# Patient Record
Sex: MALE | Race: WHITE | NOT HISPANIC OR LATINO | Employment: UNEMPLOYED | URBAN - METROPOLITAN AREA
[De-identification: names, ages, dates, MRNs, and addresses within clinical notes are randomized per-mention and may not be internally consistent; named-entity substitution may affect disease eponyms.]

---

## 2021-01-14 ENCOUNTER — TELEPHONE (OUTPATIENT)
Dept: ENDOCRINOLOGY | Facility: CLINIC | Age: 61
End: 2021-01-14

## 2021-01-20 ENCOUNTER — TELEPHONE (OUTPATIENT)
Dept: NEUROSURGERY | Facility: CLINIC | Age: 61
End: 2021-01-20

## 2021-01-20 RX ORDER — ESOMEPRAZOLE MAGNESIUM 20 MG
20 CAPSULE,DELAYED RELEASE (ENTERIC COATED) ORAL DAILY
COMMUNITY
Start: 2020-12-22

## 2021-01-20 RX ORDER — TESTOSTERONE CYPIONATE 200 MG/ML
INJECTION, SOLUTION INTRAMUSCULAR
COMMUNITY
Start: 2020-10-13 | End: 2021-02-25

## 2021-01-20 RX ORDER — POLYETHYLENE GLYCOL 3350 17 G/17G
POWDER, FOR SOLUTION ORAL
COMMUNITY
Start: 2020-11-18 | End: 2023-02-24

## 2021-01-20 RX ORDER — FUROSEMIDE 40 MG/1
40 TABLET ORAL 2 TIMES DAILY
COMMUNITY
Start: 2020-12-21

## 2021-01-20 RX ORDER — DICLOFENAC SODIUM 10 MG/G
GEL TOPICAL
COMMUNITY
Start: 2020-12-15 | End: 2021-05-03 | Stop reason: SDUPTHER

## 2021-01-20 RX ORDER — DOCUSATE CALCIUM 240 MG
240 CAPSULE ORAL DAILY
COMMUNITY
Start: 2020-12-21

## 2021-01-20 RX ORDER — LEVOFLOXACIN 500 MG/1
500 TABLET, FILM COATED ORAL DAILY
COMMUNITY
Start: 2021-01-19 | End: 2022-04-18

## 2021-01-20 RX ORDER — LANOLIN ALCOHOL/MO/W.PET/CERES
400 CREAM (GRAM) TOPICAL 2 TIMES DAILY
COMMUNITY
Start: 2020-12-21

## 2021-01-20 RX ORDER — ERENUMAB-AOOE 140 MG/ML
INJECTION, SOLUTION SUBCUTANEOUS
COMMUNITY
Start: 2021-01-14

## 2021-01-20 RX ORDER — CLINDAMYCIN PHOSPHATE 10 MG/G
GEL TOPICAL
COMMUNITY
Start: 2020-10-13

## 2021-01-20 RX ORDER — LEVOTHYROXINE SODIUM 75 UG/1
TABLET ORAL
COMMUNITY
Start: 2020-12-21 | End: 2021-02-18 | Stop reason: SDUPTHER

## 2021-01-20 RX ORDER — LINACLOTIDE 290 UG/1
CAPSULE, GELATIN COATED ORAL
COMMUNITY
Start: 2020-10-13 | End: 2021-02-18

## 2021-01-20 RX ORDER — AMMONIUM LACTATE 12 G/100G
LOTION TOPICAL
COMMUNITY
Start: 2020-10-13

## 2021-01-20 RX ORDER — LUBIPROSTONE 8 UG/1
CAPSULE, GELATIN COATED ORAL
COMMUNITY
Start: 2021-01-14 | End: 2021-01-20

## 2021-01-20 RX ORDER — PYRIDOXINE HYDROCHLORIDE 100 MG/ML
INJECTION, SOLUTION INTRAMUSCULAR; INTRAVENOUS
COMMUNITY
Start: 2020-11-13

## 2021-01-20 RX ORDER — CETIRIZINE HYDROCHLORIDE 10 MG/1
10 TABLET ORAL DAILY
COMMUNITY
Start: 2020-12-21

## 2021-01-20 RX ORDER — LIDOCAINE 50 MG/G
PATCH TOPICAL
COMMUNITY
Start: 2020-10-20 | End: 2021-05-03 | Stop reason: SDUPTHER

## 2021-01-20 RX ORDER — INFLUENZA A VIRUS A/NEBRASKA/14/2019 (H1N1) ANTIGEN (MDCK CELL DERIVED, PROPIOLACTONE INACTIVATED), INFLUENZA A VIRUS A/DELAWARE/39/2019 (H3N2) ANTIGEN (MDCK CELL DERIVED, PROPIOLACTONE INACTIVATED), INFLUENZA B VIRUS B/SINGAPORE/INFTT-16-0610/2016 ANTIGEN (MDCK CELL DERIVED, PROPIOLACTONE INACTIVATED), INFLUENZA B VIRUS B/DARWIN/7/2019 ANTIGEN (MDCK CELL DERIVED, PROPIOLACTONE INACTIVATED) 15; 15; 15; 15 UG/.5ML; UG/.5ML; UG/.5ML; UG/.5ML
INJECTION, SUSPENSION INTRAMUSCULAR
COMMUNITY
Start: 2020-10-31 | End: 2022-11-29

## 2021-02-18 ENCOUNTER — OFFICE VISIT (OUTPATIENT)
Dept: NEUROSURGERY | Facility: CLINIC | Age: 61
End: 2021-02-18
Payer: OTHER GOVERNMENT

## 2021-02-18 ENCOUNTER — OFFICE VISIT (OUTPATIENT)
Dept: ENDOCRINOLOGY | Facility: CLINIC | Age: 61
End: 2021-02-18
Payer: OTHER GOVERNMENT

## 2021-02-18 ENCOUNTER — TELEPHONE (OUTPATIENT)
Dept: PAIN MEDICINE | Facility: CLINIC | Age: 61
End: 2021-02-18

## 2021-02-18 ENCOUNTER — OFFICE VISIT (OUTPATIENT)
Dept: PAIN MEDICINE | Facility: CLINIC | Age: 61
End: 2021-02-18
Payer: OTHER GOVERNMENT

## 2021-02-18 VITALS
HEIGHT: 71 IN | RESPIRATION RATE: 18 BRPM | SYSTOLIC BLOOD PRESSURE: 98 MMHG | BODY MASS INDEX: 15.2 KG/M2 | HEART RATE: 70 BPM | WEIGHT: 108.56 LBS | TEMPERATURE: 97 F | DIASTOLIC BLOOD PRESSURE: 56 MMHG | OXYGEN SATURATION: 99 %

## 2021-02-18 VITALS
RESPIRATION RATE: 18 BRPM | BODY MASS INDEX: 15.18 KG/M2 | SYSTOLIC BLOOD PRESSURE: 98 MMHG | HEART RATE: 80 BPM | WEIGHT: 108.44 LBS | TEMPERATURE: 98 F | HEIGHT: 71 IN | DIASTOLIC BLOOD PRESSURE: 56 MMHG

## 2021-02-18 VITALS
HEART RATE: 80 BPM | BODY MASS INDEX: 15.12 KG/M2 | WEIGHT: 108 LBS | OXYGEN SATURATION: 99 % | HEIGHT: 71 IN | SYSTOLIC BLOOD PRESSURE: 98 MMHG | DIASTOLIC BLOOD PRESSURE: 56 MMHG | TEMPERATURE: 97 F

## 2021-02-18 DIAGNOSIS — M54.12 CERVICAL RADICULOPATHY: Primary | ICD-10-CM

## 2021-02-18 DIAGNOSIS — Z01.818 ENCOUNTER FOR OTHER PREPROCEDURAL EXAMINATION: ICD-10-CM

## 2021-02-18 DIAGNOSIS — E29.1 HYPOGONADISM IN MALE: ICD-10-CM

## 2021-02-18 DIAGNOSIS — G56.20 CUBITAL TUNNEL SYNDROME, UNSPECIFIED LATERALITY: Primary | ICD-10-CM

## 2021-02-18 DIAGNOSIS — M47.816 LUMBAR SPONDYLOSIS: ICD-10-CM

## 2021-02-18 DIAGNOSIS — M43.12 SPONDYLOLISTHESIS OF CERVICAL REGION: ICD-10-CM

## 2021-02-18 DIAGNOSIS — Z98.1 HISTORY OF SPINAL FUSION: ICD-10-CM

## 2021-02-18 DIAGNOSIS — M50.30 DDD (DEGENERATIVE DISC DISEASE), CERVICAL: ICD-10-CM

## 2021-02-18 DIAGNOSIS — G43.909 MIGRAINE SYNDROME: ICD-10-CM

## 2021-02-18 DIAGNOSIS — E03.9 HYPOTHYROIDISM, UNSPECIFIED TYPE: ICD-10-CM

## 2021-02-18 DIAGNOSIS — E23.0 HYPOPITUITARISM: Primary | ICD-10-CM

## 2021-02-18 DIAGNOSIS — M85.89 OSTEOPENIA OF MULTIPLE SITES: ICD-10-CM

## 2021-02-18 DIAGNOSIS — G89.4 CHRONIC PAIN DISORDER: ICD-10-CM

## 2021-02-18 PROCEDURE — 99204 OFFICE O/P NEW MOD 45 MIN: CPT | Mod: S$PBB,,, | Performed by: INTERNAL MEDICINE

## 2021-02-18 PROCEDURE — 99999 PR PBB SHADOW E&M-EST. PATIENT-LVL IV: CPT | Mod: PBBFAC,,, | Performed by: STUDENT IN AN ORGANIZED HEALTH CARE EDUCATION/TRAINING PROGRAM

## 2021-02-18 PROCEDURE — 99214 OFFICE O/P EST MOD 30 MIN: CPT | Mod: PBBFAC,27,PO | Performed by: INTERNAL MEDICINE

## 2021-02-18 PROCEDURE — 99205 OFFICE O/P NEW HI 60 MIN: CPT | Mod: S$PBB,,, | Performed by: STUDENT IN AN ORGANIZED HEALTH CARE EDUCATION/TRAINING PROGRAM

## 2021-02-18 PROCEDURE — 99205 PR OFFICE/OUTPT VISIT, NEW, LEVL V, 60-74 MIN: ICD-10-PCS | Mod: S$PBB,,, | Performed by: STUDENT IN AN ORGANIZED HEALTH CARE EDUCATION/TRAINING PROGRAM

## 2021-02-18 PROCEDURE — 99215 OFFICE O/P EST HI 40 MIN: CPT | Mod: PBBFAC,27,PN | Performed by: ANESTHESIOLOGY

## 2021-02-18 PROCEDURE — 99999 PR PBB SHADOW E&M-EST. PATIENT-LVL V: ICD-10-PCS | Mod: PBBFAC,,, | Performed by: ANESTHESIOLOGY

## 2021-02-18 PROCEDURE — 99214 OFFICE O/P EST MOD 30 MIN: CPT | Mod: PBBFAC,PN | Performed by: STUDENT IN AN ORGANIZED HEALTH CARE EDUCATION/TRAINING PROGRAM

## 2021-02-18 PROCEDURE — 99999 PR PBB SHADOW E&M-EST. PATIENT-LVL IV: ICD-10-PCS | Mod: PBBFAC,,, | Performed by: STUDENT IN AN ORGANIZED HEALTH CARE EDUCATION/TRAINING PROGRAM

## 2021-02-18 PROCEDURE — 99204 OFFICE O/P NEW MOD 45 MIN: CPT | Mod: S$PBB,,, | Performed by: ANESTHESIOLOGY

## 2021-02-18 PROCEDURE — 99999 PR PBB SHADOW E&M-EST. PATIENT-LVL V: CPT | Mod: PBBFAC,,, | Performed by: ANESTHESIOLOGY

## 2021-02-18 PROCEDURE — 99999 PR PBB SHADOW E&M-EST. PATIENT-LVL IV: CPT | Mod: PBBFAC,,, | Performed by: INTERNAL MEDICINE

## 2021-02-18 PROCEDURE — 99204 PR OFFICE/OUTPT VISIT, NEW, LEVL IV, 45-59 MIN: ICD-10-PCS | Mod: S$PBB,,, | Performed by: ANESTHESIOLOGY

## 2021-02-18 PROCEDURE — 99999 PR PBB SHADOW E&M-EST. PATIENT-LVL IV: ICD-10-PCS | Mod: PBBFAC,,, | Performed by: INTERNAL MEDICINE

## 2021-02-18 PROCEDURE — 99204 PR OFFICE/OUTPT VISIT, NEW, LEVL IV, 45-59 MIN: ICD-10-PCS | Mod: S$PBB,,, | Performed by: INTERNAL MEDICINE

## 2021-02-18 RX ORDER — OXYCODONE AND ACETAMINOPHEN 5; 325 MG/1; MG/1
1 TABLET ORAL EVERY 12 HOURS PRN
Qty: 60 TABLET | Refills: 0 | Status: SHIPPED | OUTPATIENT
Start: 2021-02-18 | End: 2021-03-20

## 2021-02-18 RX ORDER — LEVOTHYROXINE SODIUM 75 UG/1
75 TABLET ORAL
Qty: 90 TABLET | Refills: 3 | Status: SHIPPED | OUTPATIENT
Start: 2021-02-18 | End: 2021-08-05 | Stop reason: SDUPTHER

## 2021-02-25 ENCOUNTER — TELEPHONE (OUTPATIENT)
Dept: ENDOCRINOLOGY | Facility: CLINIC | Age: 61
End: 2021-02-25

## 2021-02-25 DIAGNOSIS — E29.1 HYPOGONADISM MALE: Primary | ICD-10-CM

## 2021-02-25 RX ORDER — TESTOSTERONE CYPIONATE 200 MG/ML
100 INJECTION, SOLUTION INTRAMUSCULAR
Qty: 2 ML | Refills: 4 | Status: SHIPPED | OUTPATIENT
Start: 2021-02-25 | End: 2021-08-05 | Stop reason: SDUPTHER

## 2021-03-10 ENCOUNTER — TELEPHONE (OUTPATIENT)
Dept: PAIN MEDICINE | Facility: CLINIC | Age: 61
End: 2021-03-10

## 2021-03-18 ENCOUNTER — OFFICE VISIT (OUTPATIENT)
Dept: PAIN MEDICINE | Facility: CLINIC | Age: 61
End: 2021-03-18
Payer: OTHER GOVERNMENT

## 2021-03-18 ENCOUNTER — TELEPHONE (OUTPATIENT)
Dept: ENDOCRINOLOGY | Facility: CLINIC | Age: 61
End: 2021-03-18

## 2021-03-18 ENCOUNTER — TELEPHONE (OUTPATIENT)
Dept: PAIN MEDICINE | Facility: CLINIC | Age: 61
End: 2021-03-18

## 2021-03-18 DIAGNOSIS — R26.81 UNSTEADY GAIT: ICD-10-CM

## 2021-03-18 DIAGNOSIS — M50.30 DDD (DEGENERATIVE DISC DISEASE), CERVICAL: ICD-10-CM

## 2021-03-18 DIAGNOSIS — R26.89 IMBALANCE: ICD-10-CM

## 2021-03-18 DIAGNOSIS — G43.909 MIGRAINE SYNDROME: ICD-10-CM

## 2021-03-18 DIAGNOSIS — M54.12 CERVICAL RADICULOPATHY: ICD-10-CM

## 2021-03-18 DIAGNOSIS — M47.816 LUMBAR SPONDYLOSIS: ICD-10-CM

## 2021-03-18 DIAGNOSIS — R53.81 DEBILITY: Primary | ICD-10-CM

## 2021-03-18 DIAGNOSIS — M48.02 CERVICAL SPINAL STENOSIS: ICD-10-CM

## 2021-03-18 PROCEDURE — 99214 PR OFFICE/OUTPT VISIT, EST, LEVL IV, 30-39 MIN: ICD-10-PCS | Mod: 95,,, | Performed by: ANESTHESIOLOGY

## 2021-03-18 PROCEDURE — 99214 OFFICE O/P EST MOD 30 MIN: CPT | Mod: 95,,, | Performed by: ANESTHESIOLOGY

## 2021-03-18 RX ORDER — OXYCODONE AND ACETAMINOPHEN 5; 325 MG/1; MG/1
1 TABLET ORAL EVERY 12 HOURS PRN
Qty: 60 TABLET | Refills: 0 | Status: CANCELLED | OUTPATIENT
Start: 2021-03-20 | End: 2021-04-19

## 2021-03-18 RX ORDER — OXYCODONE AND ACETAMINOPHEN 5; 325 MG/1; MG/1
1 TABLET ORAL EVERY 12 HOURS PRN
Qty: 60 TABLET | Refills: 0 | Status: CANCELLED | OUTPATIENT
Start: 2021-04-19 | End: 2021-05-19

## 2021-03-19 ENCOUNTER — TELEPHONE (OUTPATIENT)
Dept: PAIN MEDICINE | Facility: CLINIC | Age: 61
End: 2021-03-19

## 2021-03-22 ENCOUNTER — PATIENT MESSAGE (OUTPATIENT)
Dept: PAIN MEDICINE | Facility: CLINIC | Age: 61
End: 2021-03-22

## 2021-03-22 RX ORDER — OXYCODONE AND ACETAMINOPHEN 5; 325 MG/1; MG/1
1 TABLET ORAL EVERY 12 HOURS PRN
Qty: 60 TABLET | Refills: 0 | Status: SHIPPED | OUTPATIENT
Start: 2021-03-22 | End: 2021-04-19 | Stop reason: SDUPTHER

## 2021-03-24 ENCOUNTER — PATIENT MESSAGE (OUTPATIENT)
Dept: ENDOCRINOLOGY | Facility: CLINIC | Age: 61
End: 2021-03-24

## 2021-03-26 ENCOUNTER — TELEPHONE (OUTPATIENT)
Dept: ENDOCRINOLOGY | Facility: CLINIC | Age: 61
End: 2021-03-26

## 2021-04-05 ENCOUNTER — TELEPHONE (OUTPATIENT)
Dept: NEUROSURGERY | Facility: CLINIC | Age: 61
End: 2021-04-05

## 2021-04-13 ENCOUNTER — TELEPHONE (OUTPATIENT)
Dept: PAIN MEDICINE | Facility: CLINIC | Age: 61
End: 2021-04-13

## 2021-04-14 ENCOUNTER — TELEPHONE (OUTPATIENT)
Dept: PAIN MEDICINE | Facility: CLINIC | Age: 61
End: 2021-04-14

## 2021-04-19 ENCOUNTER — PATIENT MESSAGE (OUTPATIENT)
Dept: PAIN MEDICINE | Facility: CLINIC | Age: 61
End: 2021-04-19

## 2021-04-20 RX ORDER — OXYCODONE AND ACETAMINOPHEN 5; 325 MG/1; MG/1
1 TABLET ORAL EVERY 12 HOURS PRN
Qty: 60 TABLET | Refills: 0 | Status: SHIPPED | OUTPATIENT
Start: 2021-04-20 | End: 2021-05-03 | Stop reason: SDUPTHER

## 2021-04-22 ENCOUNTER — PATIENT MESSAGE (OUTPATIENT)
Dept: ENDOCRINOLOGY | Facility: CLINIC | Age: 61
End: 2021-04-22

## 2021-04-22 ENCOUNTER — PATIENT MESSAGE (OUTPATIENT)
Dept: PAIN MEDICINE | Facility: CLINIC | Age: 61
End: 2021-04-22

## 2021-04-22 DIAGNOSIS — R00.0 TACHYCARDIA: Primary | ICD-10-CM

## 2021-04-23 ENCOUNTER — TELEPHONE (OUTPATIENT)
Dept: ENDOCRINOLOGY | Facility: CLINIC | Age: 61
End: 2021-04-23

## 2021-04-26 ENCOUNTER — TELEPHONE (OUTPATIENT)
Dept: ELECTROPHYSIOLOGY | Facility: CLINIC | Age: 61
End: 2021-04-26

## 2021-04-26 DIAGNOSIS — I47.20 VENTRICULAR TACHYCARDIA: Primary | ICD-10-CM

## 2021-04-28 ENCOUNTER — PATIENT MESSAGE (OUTPATIENT)
Dept: ELECTROPHYSIOLOGY | Facility: CLINIC | Age: 61
End: 2021-04-28

## 2021-04-28 ENCOUNTER — TELEPHONE (OUTPATIENT)
Dept: ELECTROPHYSIOLOGY | Facility: CLINIC | Age: 61
End: 2021-04-28

## 2021-04-28 ENCOUNTER — OFFICE VISIT (OUTPATIENT)
Dept: ELECTROPHYSIOLOGY | Facility: CLINIC | Age: 61
End: 2021-04-28
Payer: OTHER GOVERNMENT

## 2021-04-28 VITALS
DIASTOLIC BLOOD PRESSURE: 64 MMHG | BODY MASS INDEX: 15.46 KG/M2 | HEIGHT: 70 IN | HEART RATE: 50 BPM | WEIGHT: 108 LBS | SYSTOLIC BLOOD PRESSURE: 102 MMHG

## 2021-04-28 DIAGNOSIS — I47.20 VENTRICULAR TACHYCARDIA: ICD-10-CM

## 2021-04-28 DIAGNOSIS — R91.8 LUNG MASS: ICD-10-CM

## 2021-04-28 DIAGNOSIS — J84.116 CRYPTOGENIC ORGANIZING PNEUMONIA: ICD-10-CM

## 2021-04-28 DIAGNOSIS — I47.29 NSVT (NONSUSTAINED VENTRICULAR TACHYCARDIA): ICD-10-CM

## 2021-04-28 DIAGNOSIS — R07.89 CHEST DISCOMFORT: ICD-10-CM

## 2021-04-28 DIAGNOSIS — E03.2 HYPOTHYROIDISM DUE TO MEDICATION: Primary | ICD-10-CM

## 2021-04-28 DIAGNOSIS — I20.0 UNSTABLE ANGINA PECTORIS: ICD-10-CM

## 2021-04-28 DIAGNOSIS — I49.3 PVC (PREMATURE VENTRICULAR CONTRACTION): ICD-10-CM

## 2021-04-28 DIAGNOSIS — D49.7 PITUITARY TUMOR: ICD-10-CM

## 2021-04-28 PROCEDURE — 93005 ELECTROCARDIOGRAM TRACING: CPT | Mod: PBBFAC | Performed by: INTERNAL MEDICINE

## 2021-04-28 PROCEDURE — 99205 PR OFFICE/OUTPT VISIT, NEW, LEVL V, 60-74 MIN: ICD-10-PCS | Mod: S$PBB,,, | Performed by: INTERNAL MEDICINE

## 2021-04-28 PROCEDURE — 93010 RHYTHM STRIP: ICD-10-PCS | Mod: S$PBB,,, | Performed by: INTERNAL MEDICINE

## 2021-04-28 PROCEDURE — 99215 OFFICE O/P EST HI 40 MIN: CPT | Mod: PBBFAC | Performed by: INTERNAL MEDICINE

## 2021-04-28 PROCEDURE — 99205 OFFICE O/P NEW HI 60 MIN: CPT | Mod: S$PBB,,, | Performed by: INTERNAL MEDICINE

## 2021-04-28 PROCEDURE — 93010 ELECTROCARDIOGRAM REPORT: CPT | Mod: S$PBB,,, | Performed by: INTERNAL MEDICINE

## 2021-04-28 PROCEDURE — 99999 PR PBB SHADOW E&M-EST. PATIENT-LVL V: ICD-10-PCS | Mod: PBBFAC,,, | Performed by: INTERNAL MEDICINE

## 2021-04-28 PROCEDURE — 99999 PR PBB SHADOW E&M-EST. PATIENT-LVL V: CPT | Mod: PBBFAC,,, | Performed by: INTERNAL MEDICINE

## 2021-04-28 RX ORDER — NITROGLYCERIN 400 UG/1
SPRAY ORAL
COMMUNITY
Start: 2021-04-21 | End: 2021-08-02

## 2021-04-28 RX ORDER — LUBIPROSTONE 24 UG/1
24 CAPSULE ORAL 2 TIMES DAILY WITH MEALS
COMMUNITY

## 2021-04-28 RX ORDER — DIPHENHYDRAMINE HYDROCHLORIDE 25 MG/1
25 CAPSULE ORAL
COMMUNITY
Start: 2021-04-16 | End: 2022-11-29

## 2021-04-28 RX ORDER — METOPROLOL TARTRATE 25 MG/1
25 TABLET, FILM COATED ORAL DAILY
COMMUNITY
Start: 2021-04-21 | End: 2021-04-28

## 2021-04-28 RX ORDER — ATORVASTATIN CALCIUM 40 MG/1
40 TABLET, FILM COATED ORAL DAILY
COMMUNITY
Start: 2021-04-16 | End: 2023-06-01

## 2021-04-28 RX ORDER — PREDNISONE 10 MG/1
10 TABLET ORAL DAILY
COMMUNITY
Start: 2021-04-16 | End: 2021-04-28

## 2021-04-28 RX ORDER — ASPIRIN 81 MG/1
81 TABLET ORAL DAILY
COMMUNITY
Start: 2021-04-16

## 2021-04-28 RX ORDER — METOPROLOL SUCCINATE 50 MG/1
50 TABLET, EXTENDED RELEASE ORAL DAILY
COMMUNITY
Start: 2021-04-16

## 2021-05-03 ENCOUNTER — PATIENT MESSAGE (OUTPATIENT)
Dept: ELECTROPHYSIOLOGY | Facility: CLINIC | Age: 61
End: 2021-05-03

## 2021-05-03 ENCOUNTER — OFFICE VISIT (OUTPATIENT)
Dept: PAIN MEDICINE | Facility: CLINIC | Age: 61
End: 2021-05-03
Payer: OTHER GOVERNMENT

## 2021-05-03 DIAGNOSIS — R53.81 DEBILITY: Primary | ICD-10-CM

## 2021-05-03 DIAGNOSIS — R91.8 LUNG MASS: ICD-10-CM

## 2021-05-03 DIAGNOSIS — R07.89 CHEST DISCOMFORT: ICD-10-CM

## 2021-05-03 DIAGNOSIS — R26.89 IMBALANCE: ICD-10-CM

## 2021-05-03 DIAGNOSIS — M48.02 CERVICAL SPINAL STENOSIS: ICD-10-CM

## 2021-05-03 DIAGNOSIS — R53.83 FATIGUE, UNSPECIFIED TYPE: ICD-10-CM

## 2021-05-03 PROCEDURE — 99024 PR POST-OP FOLLOW-UP VISIT: ICD-10-PCS | Mod: 95,,, | Performed by: ANESTHESIOLOGY

## 2021-05-03 PROCEDURE — 99024 POSTOP FOLLOW-UP VISIT: CPT | Mod: 95,,, | Performed by: ANESTHESIOLOGY

## 2021-05-03 RX ORDER — OXYCODONE AND ACETAMINOPHEN 5; 325 MG/1; MG/1
1 TABLET ORAL EVERY 12 HOURS PRN
Qty: 60 TABLET | Refills: 0 | Status: SHIPPED | OUTPATIENT
Start: 2021-06-19 | End: 2021-07-19

## 2021-05-03 RX ORDER — OXYCODONE AND ACETAMINOPHEN 5; 325 MG/1; MG/1
1 TABLET ORAL EVERY 12 HOURS PRN
Qty: 60 TABLET | Refills: 0 | Status: SHIPPED | OUTPATIENT
Start: 2021-07-19 | End: 2021-08-02 | Stop reason: SDUPTHER

## 2021-05-03 RX ORDER — LIDOCAINE 50 MG/G
PATCH TOPICAL DAILY
Qty: 30 PATCH | Refills: 3 | Status: SHIPPED | OUTPATIENT
Start: 2021-05-03 | End: 2021-08-02 | Stop reason: SDUPTHER

## 2021-05-03 RX ORDER — OXYCODONE AND ACETAMINOPHEN 5; 325 MG/1; MG/1
1 TABLET ORAL EVERY 12 HOURS PRN
Qty: 60 TABLET | Refills: 0 | Status: SHIPPED | OUTPATIENT
Start: 2021-05-20 | End: 2021-06-19

## 2021-05-03 RX ORDER — DICLOFENAC SODIUM 10 MG/G
GEL TOPICAL 3 TIMES DAILY PRN
Qty: 1 TUBE | Refills: 5 | Status: SHIPPED | OUTPATIENT
Start: 2021-05-03 | End: 2021-08-02 | Stop reason: SDUPTHER

## 2021-05-04 ENCOUNTER — PATIENT MESSAGE (OUTPATIENT)
Dept: ELECTROPHYSIOLOGY | Facility: CLINIC | Age: 61
End: 2021-05-04

## 2021-05-06 ENCOUNTER — HOSPITAL ENCOUNTER (OUTPATIENT)
Dept: CARDIOLOGY | Facility: HOSPITAL | Age: 61
Discharge: HOME | End: 2021-05-06
Attending: INTERNAL MEDICINE
Payer: OTHER GOVERNMENT

## 2021-05-06 VITALS — BODY MASS INDEX: 15.46 KG/M2 | WEIGHT: 108 LBS | HEIGHT: 70 IN

## 2021-05-06 DIAGNOSIS — I49.3 PVC (PREMATURE VENTRICULAR CONTRACTION): ICD-10-CM

## 2021-05-06 DIAGNOSIS — I47.29 NSVT (NONSUSTAINED VENTRICULAR TACHYCARDIA): ICD-10-CM

## 2021-05-06 LAB
CV PHARM DOSE: 27.5 MG
CV STRESS BASE HR: 46 BPM
DIASTOLIC BLOOD PRESSURE: 66 MMHG
END DIASTOLIC INDEX-HIGH: 170 ML/M2
END SYSTOLIC INDEX-HIGH: 70 ML/M2
OHS CV CPX 85 PERCENT MAX PREDICTED HEART RATE MALE: 135
OHS CV CPX MAX PREDICTED HEART RATE: 159
OHS CV CPX PATIENT IS FEMALE: 0
OHS CV CPX PATIENT IS MALE: 1
OHS CV CPX PEAK DIASTOLIC BLOOD PRESSURE: 59 MMHG
OHS CV CPX PEAK HEAR RATE: 52 BPM
OHS CV CPX PEAK RATE PRESSURE PRODUCT: 5460
OHS CV CPX PEAK SYSTOLIC BLOOD PRESSURE: 105 MMHG
OHS CV CPX PERCENT MAX PREDICTED HEART RATE ACHIEVED: 33
OHS CV CPX RATE PRESSURE PRODUCT PRESENTING: 5474
RETIRED EF AND QEF - SEE NOTES: 51 %
SYSTOLIC BLOOD PRESSURE: 119 MMHG

## 2021-05-06 PROCEDURE — 93016 CARDIAC PET SCAN STRESS (CUPID ONLY): ICD-10-PCS | Mod: ,,, | Performed by: INTERNAL MEDICINE

## 2021-05-06 PROCEDURE — 78492 CARDIAC PET SCAN STRESS (CUPID ONLY): ICD-10-PCS | Mod: 26,,, | Performed by: INTERNAL MEDICINE

## 2021-05-06 PROCEDURE — 63600175 PHARM REV CODE 636 W HCPCS: Performed by: INTERNAL MEDICINE

## 2021-05-06 PROCEDURE — 93016 CV STRESS TEST SUPVJ ONLY: CPT | Mod: ,,, | Performed by: INTERNAL MEDICINE

## 2021-05-06 PROCEDURE — 93017 CV STRESS TEST TRACING ONLY: CPT

## 2021-05-06 PROCEDURE — 78492 MYOCRD IMG PET MLT RST&STRS: CPT

## 2021-05-06 PROCEDURE — 93018 CARDIAC PET SCAN STRESS (CUPID ONLY): ICD-10-PCS | Mod: ,,, | Performed by: INTERNAL MEDICINE

## 2021-05-06 PROCEDURE — 78492 MYOCRD IMG PET MLT RST&STRS: CPT | Mod: 26,,, | Performed by: INTERNAL MEDICINE

## 2021-05-06 PROCEDURE — 93018 CV STRESS TEST I&R ONLY: CPT | Mod: ,,, | Performed by: INTERNAL MEDICINE

## 2021-05-06 RX ORDER — DIPYRIDAMOLE 5 MG/ML
27.49 INJECTION INTRAVENOUS ONCE
Status: COMPLETED | OUTPATIENT
Start: 2021-05-06 | End: 2021-05-06

## 2021-05-06 RX ADMIN — DIPYRIDAMOLE 27.5 MG: 5 INJECTION INTRAVENOUS at 03:05

## 2021-05-17 ENCOUNTER — PATIENT MESSAGE (OUTPATIENT)
Dept: PAIN MEDICINE | Facility: CLINIC | Age: 61
End: 2021-05-17

## 2021-07-21 ENCOUNTER — PATIENT MESSAGE (OUTPATIENT)
Dept: PAIN MEDICINE | Facility: CLINIC | Age: 61
End: 2021-07-21

## 2021-07-21 ENCOUNTER — PATIENT MESSAGE (OUTPATIENT)
Dept: ENDOCRINOLOGY | Facility: CLINIC | Age: 61
End: 2021-07-21

## 2021-07-21 DIAGNOSIS — R79.89 LOW IGF-1 LEVEL: ICD-10-CM

## 2021-07-21 DIAGNOSIS — E29.1 HYPOGONADISM MALE: Primary | ICD-10-CM

## 2021-07-21 DIAGNOSIS — E23.0 HYPOPITUITARISM: ICD-10-CM

## 2021-07-27 ENCOUNTER — TELEPHONE (OUTPATIENT)
Dept: ENDOCRINOLOGY | Facility: CLINIC | Age: 61
End: 2021-07-27

## 2021-08-02 ENCOUNTER — OFFICE VISIT (OUTPATIENT)
Dept: PAIN MEDICINE | Facility: CLINIC | Age: 61
End: 2021-08-02
Payer: OTHER GOVERNMENT

## 2021-08-02 ENCOUNTER — PATIENT MESSAGE (OUTPATIENT)
Dept: ENDOCRINOLOGY | Facility: CLINIC | Age: 61
End: 2021-08-02

## 2021-08-02 DIAGNOSIS — G89.4 CHRONIC PAIN DISORDER: ICD-10-CM

## 2021-08-02 DIAGNOSIS — M54.12 CERVICAL RADICULOPATHY: ICD-10-CM

## 2021-08-02 DIAGNOSIS — M48.02 CERVICAL SPINAL STENOSIS: Primary | ICD-10-CM

## 2021-08-02 DIAGNOSIS — R53.81 DEBILITY: ICD-10-CM

## 2021-08-02 DIAGNOSIS — R53.83 FATIGUE, UNSPECIFIED TYPE: ICD-10-CM

## 2021-08-02 PROCEDURE — 99213 PR OFFICE/OUTPT VISIT, EST, LEVL III, 20-29 MIN: ICD-10-PCS | Mod: 95,,, | Performed by: ANESTHESIOLOGY

## 2021-08-02 PROCEDURE — 99213 OFFICE O/P EST LOW 20 MIN: CPT | Mod: 95,,, | Performed by: ANESTHESIOLOGY

## 2021-08-02 RX ORDER — OXYCODONE AND ACETAMINOPHEN 5; 325 MG/1; MG/1
1 TABLET ORAL EVERY 12 HOURS PRN
Qty: 60 TABLET | Refills: 0 | Status: SHIPPED | OUTPATIENT
Start: 2021-09-17 | End: 2021-10-17

## 2021-08-02 RX ORDER — OXYCODONE AND ACETAMINOPHEN 5; 325 MG/1; MG/1
1 TABLET ORAL EVERY 12 HOURS PRN
Qty: 60 TABLET | Refills: 0 | Status: SHIPPED | OUTPATIENT
Start: 2021-08-18 | End: 2021-09-17

## 2021-08-02 RX ORDER — DICLOFENAC SODIUM 10 MG/G
GEL TOPICAL 3 TIMES DAILY PRN
Qty: 1 TUBE | Refills: 5 | Status: SHIPPED | OUTPATIENT
Start: 2021-08-02 | End: 2022-08-23 | Stop reason: SDUPTHER

## 2021-08-02 RX ORDER — OXYCODONE AND ACETAMINOPHEN 5; 325 MG/1; MG/1
1 TABLET ORAL EVERY 12 HOURS PRN
Qty: 60 TABLET | Refills: 0 | Status: SHIPPED | OUTPATIENT
Start: 2021-10-17 | End: 2021-11-02 | Stop reason: SDUPTHER

## 2021-08-02 RX ORDER — LIDOCAINE 50 MG/G
PATCH TOPICAL DAILY
Qty: 30 PATCH | Refills: 3 | Status: SHIPPED | OUTPATIENT
Start: 2021-08-02 | End: 2021-11-02 | Stop reason: SDUPTHER

## 2021-08-03 ENCOUNTER — TELEPHONE (OUTPATIENT)
Dept: ENDOCRINOLOGY | Facility: CLINIC | Age: 61
End: 2021-08-03

## 2021-08-04 ENCOUNTER — PATIENT MESSAGE (OUTPATIENT)
Dept: ENDOCRINOLOGY | Facility: CLINIC | Age: 61
End: 2021-08-04

## 2021-08-05 ENCOUNTER — OFFICE VISIT (OUTPATIENT)
Dept: ENDOCRINOLOGY | Facility: CLINIC | Age: 61
End: 2021-08-05
Payer: OTHER GOVERNMENT

## 2021-08-05 ENCOUNTER — PATIENT MESSAGE (OUTPATIENT)
Dept: ENDOCRINOLOGY | Facility: CLINIC | Age: 61
End: 2021-08-05

## 2021-08-05 DIAGNOSIS — E23.0 HYPOPITUITARISM: Primary | ICD-10-CM

## 2021-08-05 DIAGNOSIS — E03.9 HYPOTHYROIDISM, UNSPECIFIED TYPE: ICD-10-CM

## 2021-08-05 DIAGNOSIS — E29.1 HYPOGONADISM IN MALE: ICD-10-CM

## 2021-08-05 DIAGNOSIS — E29.1 HYPOGONADISM MALE: ICD-10-CM

## 2021-08-05 DIAGNOSIS — R79.89 LOW IGF-1 LEVEL: ICD-10-CM

## 2021-08-05 DIAGNOSIS — D35.2 BENIGN NEOPLASM OF PITUITARY GLAND: ICD-10-CM

## 2021-08-05 PROCEDURE — 99214 OFFICE O/P EST MOD 30 MIN: CPT | Mod: 95,,, | Performed by: INTERNAL MEDICINE

## 2021-08-05 PROCEDURE — 99214 PR OFFICE/OUTPT VISIT, EST, LEVL IV, 30-39 MIN: ICD-10-PCS | Mod: 95,,, | Performed by: INTERNAL MEDICINE

## 2021-08-05 RX ORDER — LEVOTHYROXINE SODIUM 75 UG/1
75 TABLET ORAL
Qty: 90 TABLET | Refills: 3 | Status: SHIPPED | OUTPATIENT
Start: 2021-08-05 | End: 2022-02-17 | Stop reason: SDUPTHER

## 2021-08-05 RX ORDER — SILDENAFIL 100 MG/1
100 TABLET, FILM COATED ORAL DAILY PRN
Qty: 30 TABLET | Refills: 5 | Status: SHIPPED | OUTPATIENT
Start: 2021-08-05 | End: 2022-02-17 | Stop reason: SDUPTHER

## 2021-08-05 RX ORDER — TESTOSTERONE CYPIONATE 200 MG/ML
100 INJECTION, SOLUTION INTRAMUSCULAR
Qty: 2 ML | Refills: 5 | Status: SHIPPED | OUTPATIENT
Start: 2021-08-05 | End: 2021-09-14 | Stop reason: SDUPTHER

## 2021-09-09 ENCOUNTER — PATIENT MESSAGE (OUTPATIENT)
Dept: ENDOCRINOLOGY | Facility: CLINIC | Age: 61
End: 2021-09-09

## 2021-09-09 DIAGNOSIS — E29.1 HYPOGONADISM MALE: ICD-10-CM

## 2021-09-14 RX ORDER — TESTOSTERONE CYPIONATE 200 MG/ML
100 INJECTION, SOLUTION INTRAMUSCULAR
Qty: 2 ML | Refills: 5 | Status: SHIPPED | OUTPATIENT
Start: 2021-09-14 | End: 2021-09-14 | Stop reason: SDUPTHER

## 2021-09-14 RX ORDER — TESTOSTERONE CYPIONATE 200 MG/ML
100 INJECTION, SOLUTION INTRAMUSCULAR
Qty: 2 ML | Refills: 5 | Status: SHIPPED | OUTPATIENT
Start: 2021-09-14 | End: 2021-10-26 | Stop reason: SDUPTHER

## 2021-09-24 ENCOUNTER — TELEPHONE (OUTPATIENT)
Dept: PAIN MEDICINE | Facility: CLINIC | Age: 61
End: 2021-09-24

## 2021-09-24 ENCOUNTER — PATIENT MESSAGE (OUTPATIENT)
Dept: ENDOCRINOLOGY | Facility: CLINIC | Age: 61
End: 2021-09-24

## 2021-09-24 DIAGNOSIS — R79.89 LOW IGF-1 LEVEL: ICD-10-CM

## 2021-09-24 DIAGNOSIS — E23.0 HYPOPITUITARISM: Primary | ICD-10-CM

## 2021-09-24 NOTE — TELEPHONE ENCOUNTER
I am not the physician ordering the lung biopsy. I am his pain management physician, you need to request from his pulmonologist or PCP

## 2021-09-27 RX ORDER — MACIMORELIN ACETATE 60 MG/MG
0.5 GRANULE, FOR SOLUTION ORAL
Qty: 1 EACH | Refills: 0 | OUTPATIENT
Start: 2021-09-27 | End: 2021-09-27 | Stop reason: RX

## 2021-10-25 ENCOUNTER — TELEPHONE (OUTPATIENT)
Dept: ENDOCRINOLOGY | Facility: CLINIC | Age: 61
End: 2021-10-25
Payer: OTHER GOVERNMENT

## 2021-10-25 ENCOUNTER — PATIENT MESSAGE (OUTPATIENT)
Dept: ENDOCRINOLOGY | Facility: CLINIC | Age: 61
End: 2021-10-25
Payer: OTHER GOVERNMENT

## 2021-10-25 DIAGNOSIS — E29.1 HYPOGONADISM MALE: ICD-10-CM

## 2021-10-25 NOTE — TELEPHONE ENCOUNTER
----- Message from Hui Latif sent at 10/25/2021  4:03 PM CDT -----  Contact: Evangelina HOYT  Type: Needs Medical Advice    Who Called:-Evangelina HOYT  Best Call Back Number:     or         Requesting a call back regarding -Evangelina BARNES  pharmacy is asking for a call back ASAP   Please Advise- Thank you

## 2021-10-26 RX ORDER — TESTOSTERONE CYPIONATE 200 MG/ML
100 INJECTION, SOLUTION INTRAMUSCULAR
Qty: 4 ML | Refills: 5 | Status: SHIPPED | OUTPATIENT
Start: 2021-10-26 | End: 2021-10-29

## 2021-10-29 RX ORDER — TESTOSTERONE CYPIONATE 200 MG/ML
100 INJECTION, SOLUTION INTRAMUSCULAR
Qty: 4 ML | Refills: 5 | Status: SHIPPED | OUTPATIENT
Start: 2021-10-29 | End: 2021-11-03

## 2021-11-02 ENCOUNTER — OFFICE VISIT (OUTPATIENT)
Dept: PAIN MEDICINE | Facility: CLINIC | Age: 61
End: 2021-11-02
Payer: OTHER GOVERNMENT

## 2021-11-02 DIAGNOSIS — M54.12 CERVICAL RADICULOPATHY: ICD-10-CM

## 2021-11-02 DIAGNOSIS — R53.81 DEBILITY: Primary | ICD-10-CM

## 2021-11-02 DIAGNOSIS — R91.8 LUNG MASS: ICD-10-CM

## 2021-11-02 DIAGNOSIS — G89.4 CHRONIC PAIN DISORDER: ICD-10-CM

## 2021-11-02 PROCEDURE — 99213 PR OFFICE/OUTPT VISIT, EST, LEVL III, 20-29 MIN: ICD-10-PCS | Mod: 95,,, | Performed by: ANESTHESIOLOGY

## 2021-11-02 PROCEDURE — 99213 OFFICE O/P EST LOW 20 MIN: CPT | Mod: 95,,, | Performed by: ANESTHESIOLOGY

## 2021-11-02 RX ORDER — OXYCODONE AND ACETAMINOPHEN 5; 325 MG/1; MG/1
1 TABLET ORAL EVERY 12 HOURS PRN
Qty: 60 TABLET | Refills: 0 | Status: SHIPPED | OUTPATIENT
Start: 2021-11-16 | End: 2021-12-16

## 2021-11-02 RX ORDER — OXYCODONE AND ACETAMINOPHEN 5; 325 MG/1; MG/1
1 TABLET ORAL EVERY 12 HOURS PRN
Qty: 60 TABLET | Refills: 0 | Status: SHIPPED | OUTPATIENT
Start: 2021-12-16 | End: 2022-01-15

## 2021-11-02 RX ORDER — OXYCODONE AND ACETAMINOPHEN 5; 325 MG/1; MG/1
1 TABLET ORAL EVERY 12 HOURS PRN
Qty: 60 TABLET | Refills: 0 | Status: SHIPPED | OUTPATIENT
Start: 2022-01-15 | End: 2022-02-14

## 2021-11-02 RX ORDER — LIDOCAINE 50 MG/G
PATCH TOPICAL DAILY
Qty: 30 PATCH | Refills: 6 | Status: SHIPPED | OUTPATIENT
Start: 2021-11-02 | End: 2022-02-17 | Stop reason: SDUPTHER

## 2021-11-03 RX ORDER — TESTOSTERONE CYPIONATE 200 MG/ML
100 INJECTION, SOLUTION INTRAMUSCULAR
Qty: 4 ML | Refills: 4 | Status: SHIPPED | OUTPATIENT
Start: 2021-11-03 | End: 2022-02-17 | Stop reason: SDUPTHER

## 2022-01-19 ENCOUNTER — PATIENT MESSAGE (OUTPATIENT)
Dept: ENDOCRINOLOGY | Facility: CLINIC | Age: 62
End: 2022-01-19
Payer: OTHER GOVERNMENT

## 2022-01-19 DIAGNOSIS — R79.89 LOW IGF-1 LEVEL: ICD-10-CM

## 2022-01-19 DIAGNOSIS — D35.2 BENIGN NEOPLASM OF PITUITARY GLAND: ICD-10-CM

## 2022-01-19 DIAGNOSIS — E29.1 HYPOGONADISM MALE: Primary | ICD-10-CM

## 2022-01-19 DIAGNOSIS — E03.9 HYPOTHYROIDISM, UNSPECIFIED TYPE: ICD-10-CM

## 2022-01-19 DIAGNOSIS — E23.0 HYPOPITUITARISM: ICD-10-CM

## 2022-01-24 ENCOUNTER — PATIENT MESSAGE (OUTPATIENT)
Dept: ENDOCRINOLOGY | Facility: CLINIC | Age: 62
End: 2022-01-24
Payer: OTHER GOVERNMENT

## 2022-01-24 NOTE — TELEPHONE ENCOUNTER
Can't do virtual unless in [place].    Okay to send orders for MRI and labs (orders entered today).

## 2022-01-31 ENCOUNTER — TELEPHONE (OUTPATIENT)
Dept: ENDOCRINOLOGY | Facility: CLINIC | Age: 62
End: 2022-01-31
Payer: OTHER GOVERNMENT

## 2022-01-31 NOTE — TELEPHONE ENCOUNTER
----- Message from Indu Buchanan MA sent at 1/28/2022  5:05 PM CST -----  Regarding: FW: Advice  Contact: [phone]    ----- Message -----  From: Zachary Savage MA  Sent: 1/28/2022   3:43 PM CST  To: Faizan Jimenez Clinical Staff  Subject: FW: Advice                                         ----- Message -----  From: Joshua Francis  Sent: 1/28/2022   3:37 PM CST  To: Kai TINOCO Staff  Subject: Advice                                           Pt is calling to speak with a nurse in the office has questions about appt. Please contact pt

## 2022-02-10 ENCOUNTER — TELEPHONE (OUTPATIENT)
Dept: ENDOCRINOLOGY | Facility: CLINIC | Age: 62
End: 2022-02-10
Payer: OTHER GOVERNMENT

## 2022-02-10 NOTE — TELEPHONE ENCOUNTER
Got labs. collected 420pm 2/3/2022    TSH 2.8  Free t4 1.2  CBC with WBC 3.15, hgb 13.2. decreased  CMP okay. Normal GFR. ast 45 (normal to 37)    IGF still low. 36.  Testosterone 135 (done in PM)    PSA 0.869    Has appointment next week, can review then

## 2022-02-16 NOTE — PROGRESS NOTES
Subjective:      Chief Complaint: Hypothyroidism, Fatigue, and Hypogonadism    The patient location is: pt reported drive to [place]  The chief complaint leading to consultation is: pituitary    Visit type: audiovisual    Face to Face time with patient: 11 minutes  31 minutes of total time spent on the encounter, which includes face to face time and non-face to face time preparing to see the patient (eg, review of tests), Obtaining and/or reviewing separately obtained history, Documenting clinical information in the electronic or other health record, Independently interpreting results (not separately reported) and communicating results to the patient/family/caregiver, or Care coordination (not separately reported).     Each patient to whom he or she provides medical services by telemedicine is:  (1) informed of the relationship between the physician and patient and the respective role of any other health care provider with respect to management of the patient; and (2) notified that he or she may decline to receive medical services by telemedicine and may withdraw from such care at any time.    Notes:    HPI: Evan Olivo is a 61 y.o. male who is having a follow-up evaluation for pituitary, hypogonadism.   Last seen 8/5/2021    Pt noted hx pituitary tumor.  Surgery 2016. Recurrence, s/p radiation around 2018.     Last MRI 2022: No residual tumor, post-operative changes, stable findings from prior MRI.  Prior MRI 2020: No residual tumor. Post-op changes.     Has hypothyroidism:  Medication: 75 mcg/day levothyroxine.   labs 2/3/2022 TSH 2.8, free t4 1.2, normal     Hypogonadism:  Notes dx around 8 years ago.  On testosterone replacement with gel, then injections. Various doses.    Currently 100 mg q7 days.    Last level 135 (drawn in PM). PSA normal, CBC okay    Last DXA 11/2020. Osteopenia.  Fractures: ankle. No trauma.    Reclast summer 2020, summer 2021 (around July)    Older labs:   WBC <2  Anemia, hemoglobin  10.  Platelets normal  PSA 0.86  Cortisol 13.6 (lab from 7/27/2021 at 805am)   Testosterone 652  IGF 45 (low)    Had recommended GH testing, not done.     Today, pt reports feeling about the same.     Weight stable overall. Lost a few lbs, gained it back.    +fatigue.  No AM erections, no libido.    +headaches, few per week, stable.    Occasional dizziness, gait instability, lightheadedness.   sometimes falls, not lately. Grabs wall and catches himself. No syncope.  +constipation, some abd cramping.    Reviewed past medical, family, social history and updated as appropriate.    Review of Systems  As above    Objective:   Previous vitals:  BP Readings from Last 5 Encounters:   04/28/21 102/64   02/18/21 (!) 98/56 02/18/21 (!) 98/56 02/18/21 (!) 98/56     Physical Exam  Constitutional:       General: he is not in acute distress.  Pulmonary:      Effort: Pulmonary effort is normal.     Wt Readings from Last 10 Encounters:   05/06/21 1519 49 kg (108 lb)   04/28/21 0812 49 kg (108 lb 0.4 oz)   02/18/21 1348 49 kg (108 lb 0.4 oz)   02/18/21 0911 49.2 kg (108 lb 7.5 oz)   02/18/21 0749 49.2 kg (108 lb 9.2 oz)     No results found for: HGBA1C  No results found for: CHOL, HDL, LDLCALC, TRIG, CHOLHDL  No results found for: NA, K, CL, CO2, GLU, BUN, CREATININE, CALCIUM, PROT, ALBUMIN, BILITOT, ALKPHOS, AST, ALT, ANIONGAP, ESTGFRAFRICA, EGFRNONAA, TSH     Assessment/Plan:     Hypopituitarism  Hx pituitary tumor several years ago.   s/p surgery   then recurrence   now s/p brain radiation   - MRI 2022 without residual tumor      - did find potential abnormality on the right side of the clivus near ICA, stable from 2018. Recommend pt f/u with PCP, can consider CT neck for further investigation there   - evaluate/treat pituitary hormones per below      Low IGF-1 level  With hypopituitarism. 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   clinically, has fatigue. Treatment can help symptom and also some benefit in bone density.    want confirmation test before considering risks vs benefits of treatment: Growth hormone stimulation testing.  Few ways to test this:  Most likely glucagon stimulation test.  Though lately, there is also an agent Macimorelin. Dose is 0.5 mg/kg. Oral. Kevin time course, fewer side effects/complications. Reviewed with pt insurance did not cover it last time I tried for that.   Protocol:    1. Measure patient's weight   2. Draw baseline lab for growth hormone   3. Patient drinks 0.5 mg/kg Macimorelin over 30 seconds or so. This is the start of the timing.   4. Draw lab for growth hormone at 30, 45, 60, and 90 minutes after drinking Macimorelin.   5. Patient is done.     Alternatively, will use glucagon stimulation test protocol:  1. Weigh Patient  2. Patient in recumbent position, start IV  3. Administer Glucagon 1mg IM (1.5mg if patient weighs more than 90kg)  4. Lab draws: Growth Hormone baseline and every 30 minutes x 8 and blood glucose finger stick baseline and every 30 minutes x 8.   5. Procedure done.    After testing, will consider GH supplementation if indicated (and if covered by insurance)      Hypogonadism in male  Long hx hypogonadism, pt noted this was a problem before pituitary tumor was found. Don't have labs/records to be sure if the cause was from the tumor or something else   either way, he has been on supplementation for a while. Gel then shot. Various doses over the years, didn't feel as good at q14 day dosing   now on 100 mg q7 days   last level much lower.   - still with symptoms   - increase to 200 mg q10 days.   - recheck levels in 3-6 months      Hypothyroidism  On 75 mcg/day   - last labs okay   - continue to monitor 1-2 times a year        Follow up in about 6 months (around 8/17/2022) for lab review, further monitoring.      Daniel Quinn MD  Endocrinology

## 2022-02-17 ENCOUNTER — PATIENT MESSAGE (OUTPATIENT)
Dept: PAIN MEDICINE | Facility: CLINIC | Age: 62
End: 2022-02-17
Payer: OTHER GOVERNMENT

## 2022-02-17 ENCOUNTER — OFFICE VISIT (OUTPATIENT)
Dept: ENDOCRINOLOGY | Facility: CLINIC | Age: 62
End: 2022-02-17
Payer: OTHER GOVERNMENT

## 2022-02-17 ENCOUNTER — TELEPHONE (OUTPATIENT)
Dept: ENDOCRINOLOGY | Facility: CLINIC | Age: 62
End: 2022-02-17

## 2022-02-17 DIAGNOSIS — E29.1 HYPOGONADISM MALE: ICD-10-CM

## 2022-02-17 DIAGNOSIS — E23.0 HYPOPITUITARISM: Primary | ICD-10-CM

## 2022-02-17 DIAGNOSIS — R79.89 LOW IGF-1 LEVEL: ICD-10-CM

## 2022-02-17 DIAGNOSIS — E29.1 HYPOGONADISM IN MALE: ICD-10-CM

## 2022-02-17 DIAGNOSIS — E03.9 HYPOTHYROIDISM, UNSPECIFIED TYPE: ICD-10-CM

## 2022-02-17 PROCEDURE — 99214 OFFICE O/P EST MOD 30 MIN: CPT | Mod: 95,,, | Performed by: INTERNAL MEDICINE

## 2022-02-17 PROCEDURE — 99214 PR OFFICE/OUTPT VISIT, EST, LEVL IV, 30-39 MIN: ICD-10-PCS | Mod: 95,,, | Performed by: INTERNAL MEDICINE

## 2022-02-17 RX ORDER — LIDOCAINE 50 MG/G
PATCH TOPICAL DAILY
Qty: 30 PATCH | Refills: 6 | Status: SHIPPED | OUTPATIENT
Start: 2022-02-17 | End: 2022-05-23

## 2022-02-17 RX ORDER — LEVOTHYROXINE SODIUM 75 UG/1
75 TABLET ORAL
Qty: 90 TABLET | Refills: 3 | Status: SHIPPED | OUTPATIENT
Start: 2022-02-17 | End: 2023-01-04 | Stop reason: SDUPTHER

## 2022-02-17 RX ORDER — SILDENAFIL 100 MG/1
100 TABLET, FILM COATED ORAL DAILY PRN
Qty: 30 TABLET | Refills: 5 | Status: SHIPPED | OUTPATIENT
Start: 2022-02-17 | End: 2022-07-27 | Stop reason: SDUPTHER

## 2022-02-17 RX ORDER — TESTOSTERONE CYPIONATE 200 MG/ML
200 INJECTION, SOLUTION INTRAMUSCULAR
Qty: 3 ML | Refills: 5 | Status: SHIPPED | OUTPATIENT
Start: 2022-02-17 | End: 2022-07-27 | Stop reason: SDUPTHER

## 2022-02-17 RX ORDER — OXYCODONE AND ACETAMINOPHEN 5; 325 MG/1; MG/1
1 TABLET ORAL EVERY 12 HOURS PRN
Qty: 60 TABLET | Refills: 0 | Status: SHIPPED | OUTPATIENT
Start: 2022-02-17 | End: 2022-03-17 | Stop reason: SDUPTHER

## 2022-02-17 NOTE — TELEPHONE ENCOUNTER
See pt message. Percocet not available to pend for [facility].     These will go to separate pharmacies.

## 2022-02-17 NOTE — ASSESSMENT & PLAN NOTE
Long hx hypogonadism, pt noted this was a problem before pituitary tumor was found. Don't have labs/records to be sure if the cause was from the tumor or something else   either way, he has been on supplementation for a while. Gel then shot. Various doses over the years, didn't feel as good at q14 day dosing   now on 100 mg q7 days   last level much lower.   - still with symptoms   - increase to 200 mg q10 days.   - recheck levels in 3-6 months

## 2022-02-17 NOTE — TELEPHONE ENCOUNTER
I spoke with pt via phone in regards to virtual visit f/u.     Rx printed by Dr. Quinn.   Lab orders for [facility].  Appointment for a 6 month f/u.     Everything will be placed in the mail today. Pt has all understanding.

## 2022-02-17 NOTE — ASSESSMENT & PLAN NOTE
With hypopituitarism. 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   clinically, has fatigue. Treatment can help symptom and also some benefit in bone density.   want confirmation test before considering risks vs benefits of treatment: Growth hormone stimulation testing.  Few ways to test this:  Most likely glucagon stimulation test.  Though lately, there is also an agent Macimorelin. Dose is 0.5 mg/kg. Oral. Bryan time course, fewer side effects/complications. Reviewed with pt insurance did not cover it last time I tried for that.   Protocol:    1. Measure patient's weight   2. Draw baseline lab for growth hormone   3. Patient drinks 0.5 mg/kg Macimorelin over 30 seconds or so. This is the start of the timing.   4. Draw lab for growth hormone at 30, 45, 60, and 90 minutes after drinking Macimorelin.   5. Patient is done.     Alternatively, will use glucagon stimulation test protocol:  1. Weigh Patient  2. Patient in recumbent position, start IV  3. Administer Glucagon 1mg IM (1.5mg if patient weighs more than 90kg)  4. Lab draws: Growth Hormone baseline and every 30 minutes x 8 and blood glucose finger stick baseline and every 30 minutes x 8.   5. Procedure done.    After testing, will consider GH supplementation if indicated (and if covered by insurance)

## 2022-03-14 ENCOUNTER — TELEPHONE (OUTPATIENT)
Dept: ENDOCRINOLOGY | Facility: CLINIC | Age: 62
End: 2022-03-14
Payer: OTHER GOVERNMENT

## 2022-03-14 ENCOUNTER — SPECIALTY PHARMACY (OUTPATIENT)
Dept: PHARMACY | Facility: CLINIC | Age: 62
End: 2022-03-14
Payer: OTHER GOVERNMENT

## 2022-03-14 NOTE — TELEPHONE ENCOUNTER
This is the patient who still needs growth hormone stimulation testing.    Tried to send macrilen twice to see if it would be covered to do the test that way, so far doesn't seem like it is. Can try to check with the specialty pharmacy if they got anything from the insurance company about it. Otherwise glucagon protocol below can work.    Options are:    Test with Macrilen. Test takes about 2 hours.  1. Weigh patient.  2. Reconsitute/mix Macrilen. Dose: 0.5 mg/kg  3. The patient must drink the entire volume of Macrilen solution within 30 seconds. Observe the patient for the duration of the test.  4. Draw labs:  No baseline blood draw is required with Macrilen. After administering Macrilen, draw venous blood samples at 30, 45, 60, and 90 minutes.   5.After that, pt is done, can go home and we will contact with results    Or, test with glucagon:    Preparation:  Nothing to eat or drink after midnight. Patients may feel nauseous during and after the test. *Procedure will take 5 hours*     Supplies:   1) 20 gauge butterfly, IV start kit, extension tubing x 1  2) Glucagon 1mg IM (1.5mg if patient weighs more than 90kg)  3) 3ml syringes and blunt needles x 9 each  4) Glucometer - Blood glucose (finger stick) x 9  5) Blood collection Tubes  a. Red Top x 9 - Growth Hormone Level  6) 2 x 2 gauze, co-wrap/tape for dressing    Procedure:  1) Weigh Patient  2) Patient in recumbent position, start IV  3) Administer Glucagon 1mg IM (1.5mg if patient weighs more than 90kg)  4) Lab draws: Growth Hormone baseline and every 30 minutes x 8 and blood glucose finger stick baseline and every 30 minutes x 8     Discharge Instructions:  Advise patient to eat small and frequent meals after the completion of the test.

## 2022-03-14 NOTE — TELEPHONE ENCOUNTER
Incoming call from provider's office about status of macimorelin. Call from Marian Mckeon at [facility]    Notified her that it has not been work-up or assigned yet but will send a message to the ID team    If rx needs a PA, she would like a status update sent via epic if PA is approved or denied    Routing ID team

## 2022-03-15 NOTE — TELEPHONE ENCOUNTER
"New Rx received for Macrilen for diagnostic testing for GH deficiency. PA required. Submitted PA through [facility] and received the following message, "This medication may be excluded from the patient's benefit. For more information, please reach out to [employer] directly at [phone]." Called [employer] and spoke with representative, Ayala, who confirms this patient's plan completely excludes coverage of Macrilen under pharmacy and medical benefits.     Confirmed with [employer] no FA available for patients without insurance coverage. Staff message sent to [facility].   " Helical Rim Text: The closure involved the helical rim.

## 2022-03-16 NOTE — TELEPHONE ENCOUNTER
Response received from provider that they will proceed with traditional glucagon testing. Closing referral at this time.

## 2022-03-17 ENCOUNTER — TELEPHONE (OUTPATIENT)
Dept: ENDOCRINOLOGY | Facility: CLINIC | Age: 62
End: 2022-03-17
Payer: OTHER GOVERNMENT

## 2022-03-17 ENCOUNTER — PATIENT MESSAGE (OUTPATIENT)
Dept: PAIN MEDICINE | Facility: CLINIC | Age: 62
End: 2022-03-17
Payer: OTHER GOVERNMENT

## 2022-03-17 DIAGNOSIS — R79.89 LOW IGF-1 LEVEL: Primary | ICD-10-CM

## 2022-03-17 RX ORDER — OXYCODONE AND ACETAMINOPHEN 5; 325 MG/1; MG/1
1 TABLET ORAL EVERY 12 HOURS PRN
Qty: 60 TABLET | Refills: 0 | Status: SHIPPED | OUTPATIENT
Start: 2022-03-17 | End: 2022-04-16

## 2022-03-17 NOTE — TELEPHONE ENCOUNTER
----- Message from Evelyn Xiong, Agnes sent at 3/15/2022  7:52 AM CDT -----  Regarding: Johnrisegun  Good morning Dr. Quinn and staff,    I wanted to reach out to inform you that unfortunately, this patient's insurance does not provide any coverage for Macrilen under pharmacy or medical benefits. I also checked to see if the  offers any assistance for patients without insurance coverage and confirmed they do not.     Please let me know if there is anything else I can do to further assist.     Thanks,    Evelyn Xiong, PharmD  Specialty Pharmacy Clinical Pharmacist  [facility]  [phone]

## 2022-03-17 NOTE — TELEPHONE ENCOUNTER
Spoke with the Authorization Dept. who reports that Mr. Olivo will need to call [phone] to find out how much his 3/22/22 Nurse Visit will cost & if no resolve with whether or not his insurance will authorize the visit he has been instructed to call [employer] & he verbalized an understanding.

## 2022-03-17 NOTE — TELEPHONE ENCOUNTER
----- Message from Leanna Renteria, Patient Care Assistant sent at 3/17/2022  1:25 PM CDT -----  Regarding: returning call  Contact: pt  Type:  Patient Returning Call    Who Called:  pt   Who Left Message for Patient:  not sure   Does the patient know what this is regarding?:  yes pre authorization   Best Call Back Number:  [phone] (home)     Additional Information:  please call pt to advise. Thanks!

## 2022-03-17 NOTE — TELEPHONE ENCOUNTER
Spoke with Mr. Olivo & scheduled an appt. on 3/22/22 with ENDO Nurse for a Growth Hormone Stimulation Test & instructions were given to be NPO after MN the day before & that water is not limited & that he can take his AM meds. & that the test will take 5 hours to complete. He wants to make sure his insurance company will approve this appt. before the visit therefore the billing company will be contacted.     Please place the orders for his growth hormone levels.

## 2022-03-17 NOTE — TELEPHONE ENCOUNTER
Gave Mr. Olivo the number to the authorization dept. [phone] to inquire if his Growth Stimulation Test on 3/22/22 need authorization.

## 2022-03-18 ENCOUNTER — TELEPHONE (OUTPATIENT)
Dept: ENDOCRINOLOGY | Facility: CLINIC | Age: 62
End: 2022-03-18
Payer: OTHER GOVERNMENT

## 2022-03-18 ENCOUNTER — PATIENT MESSAGE (OUTPATIENT)
Dept: ENDOCRINOLOGY | Facility: CLINIC | Age: 62
End: 2022-03-18
Payer: OTHER GOVERNMENT

## 2022-03-18 NOTE — TELEPHONE ENCOUNTER
"Left a VM informing him that we will submit the documents to [facility] for the authorization of Lillian but it will not be marked "STAT" per Dr. Quinn & that the appt. on 3/22/22 will be cancelled pending the approval.   "

## 2022-03-18 NOTE — TELEPHONE ENCOUNTER
----- Message from Nu Banegas sent at 3/18/2022  3:09 PM CDT -----  Contact: Pt  Type:  Patient Returning Call    Who Called:  Pt  Who Left Message for Patient:  Marian  Does the patient know what this is regarding?:  Yes  Best Call Back Number:  [phone]  Additional Information:  Please call back.  Thanks.

## 2022-03-18 NOTE — TELEPHONE ENCOUNTER
This request is for Macimorelin. 1 pouch. NDC# [id number]  ICD code:  E23.0. Hypopituitarism.     Macimorelin to be administered in clinic at a dose of 0.5 mg/kg   Following oral administration of that medication, blood will be drawn after 30, 45, 60, and 90 minutes to measure growth hormone levels to diagnose or rule out growth hormone deficiency. Additional clinical details below and in clinic note.    Patient with hypopituitarism after pituitary tumor resection as well as radiation treatment. Complicated by hypothyroidism, hypogonadism, and found to have low IGF-1 concerning for growth hormone deficiency.    Recommend growth hormone stimulation testing to evaluate. Macimorelin preferred over glucagon due to this agent being better tolerated, lower risk for side effects/complications in relation to the testing itself, and patient concerns that he would be unable to tolerate the 1.5-2 hour drive to the clinic for the test, 5 hours for the procedure itself, and the 1.5-2 hour drive back home. Strongly recommend macimorelin.

## 2022-03-18 NOTE — TELEPHONE ENCOUNTER
Mr. Olivo confirmed that he received my message regarding submitting documents to [facility] for the approval of Macrinel.

## 2022-04-05 ENCOUNTER — PATIENT MESSAGE (OUTPATIENT)
Dept: ENDOCRINOLOGY | Facility: CLINIC | Age: 62
End: 2022-04-05
Payer: OTHER GOVERNMENT

## 2022-04-05 NOTE — TELEPHONE ENCOUNTER
Left a VM with Mr. & Mrs. Olivo asking them to call Marian in Dr. Quinn's office @  [phone] to update them on the status of getting Lillian approved through .

## 2022-04-07 ENCOUNTER — PATIENT MESSAGE (OUTPATIENT)
Dept: ENDOCRINOLOGY | Facility: CLINIC | Age: 62
End: 2022-04-07
Payer: OTHER GOVERNMENT

## 2022-04-07 ENCOUNTER — TELEPHONE (OUTPATIENT)
Dept: ENDOCRINOLOGY | Facility: CLINIC | Age: 62
End: 2022-04-07
Payer: OTHER GOVERNMENT

## 2022-04-07 NOTE — TELEPHONE ENCOUNTER
Spoke with Mrs. Olivo & informed her that we received the document below but it is not the approval for Macrilen. She reports she will contact [facility] to find out what's going on & will fax me a document she has as well.         No

## 2022-04-07 NOTE — TELEPHONE ENCOUNTER
INSURANCE APPROVAL FOR ISATUXIMAB-IRFC 10MG INJECTION  FOR BENIGN NEOPLASM OF PITUITARY GLAND. START DATE: 3/15/2022 END DATE: 3/15/2023  AUTHORIZATION ORDER # [id number]  SPONSOR ID # [id number]

## 2022-04-07 NOTE — TELEPHONE ENCOUNTER
Contacted the [facility] Provider Referral Dept. regarding obtaining authorization for Macrilen medication due to the authorization that was faxed is incorrect & has the wrong medication name & route & was told that I need to speak with the prior authorization dept.     [employer] is under the impression that we are performing a procedure. I was on the phone from 3:22 PM-4:21 PM & was hung up on. I called back & had to start all over again & was on the phone from 4:22 PM-4:32 PM with General [employer] Rep. who told me I had to do a prior authorization. She placed me on hold in an attempt to contact a rep. in the prior authorization dept. but was unsuccessful. I was on hold until 5:04 PM when I had to disconnect due to it was pass my time to be off duty.    I was on the phone from 3:22 PM-5:04 PM with no resolve. I will attempt a  prior authorization tomorrow via CoverMyMeds.

## 2022-04-08 NOTE — TELEPHONE ENCOUNTER
Attempted a PA for Macrilen via CoverMyMeds & received this message:   This medication may be excluded from the patient's benefit. For more information, please reach out to [employer] directly at [phone].    Contacted [employer] & was told by Ms. Denise that Lillian is not covered at all with or without a PA or any other documentation & she referred me to [employer].    Spoke with Ms. Soto, Benefits Rep. at [facility] who reports to fax the documents to [facility] Authorization Referrals at [phone]. The documents were faxed with a receipt confirmation.     I was on the phone from 2:46 PM-3:43 PM.

## 2022-04-11 ENCOUNTER — PATIENT MESSAGE (OUTPATIENT)
Dept: ENDOCRINOLOGY | Facility: CLINIC | Age: 62
End: 2022-04-11
Payer: OTHER GOVERNMENT

## 2022-04-13 ENCOUNTER — TELEPHONE (OUTPATIENT)
Dept: ENDOCRINOLOGY | Facility: CLINIC | Age: 62
End: 2022-04-13
Payer: OTHER GOVERNMENT

## 2022-04-13 NOTE — TELEPHONE ENCOUNTER
----- Message from Jess Mcmahan sent at 4/13/2022  1:11 PM CDT -----  Contact: patient  Type:  Patient Returning Call    Who Called:  patient   Who Left Message for Patient:  Marian March  Does the patient know what this is regarding?:    Best Call Back Number:  [phone] (home)     Additional Information:

## 2022-04-13 NOTE — TELEPHONE ENCOUNTER
Spoke with David Neetu Olivo who reports  has approved for Mr. Olivo to get Macrilen from [facility] & she will call me when they get it so we can schedule the Nurse Visit. Note that Macrilen is on back order & they do not have an GINNY. Mrs. Irwin will check with [employer] to assure that the nurse visit & blood draws for glucose & growth hormone is covered.

## 2022-04-18 ENCOUNTER — PATIENT MESSAGE (OUTPATIENT)
Dept: PAIN MEDICINE | Facility: CLINIC | Age: 62
End: 2022-04-18
Payer: OTHER GOVERNMENT

## 2022-04-19 RX ORDER — OXYCODONE AND ACETAMINOPHEN 5; 325 MG/1; MG/1
1 TABLET ORAL EVERY 12 HOURS PRN
Qty: 60 TABLET | Refills: 0 | Status: SHIPPED | OUTPATIENT
Start: 2022-04-19 | End: 2022-05-18 | Stop reason: SDUPTHER

## 2022-04-19 NOTE — TELEPHONE ENCOUNTER
[facility] is not having any eRx problems. They did not receive the Rx. I copied last Rx and pended. Please review and try to send once more if possible. If Rx still does not go through, I will see if pt is okay with trying another pharmacy.

## 2022-04-19 NOTE — TELEPHONE ENCOUNTER
That's weird, no wonder. I sent it Monday and today, it's apparently not going through. Call that [facility] to see what is happening.

## 2022-04-19 NOTE — TELEPHONE ENCOUNTER
Please apologize for this, actually scented on Monday so that it would be available for him on Tuesday as that is when he planned to go to [facility].  I am not sure what happened, I do not see it on his list now

## 2022-05-18 ENCOUNTER — PATIENT MESSAGE (OUTPATIENT)
Dept: PAIN MEDICINE | Facility: CLINIC | Age: 62
End: 2022-05-18
Payer: OTHER GOVERNMENT

## 2022-05-18 RX ORDER — OXYCODONE AND ACETAMINOPHEN 5; 325 MG/1; MG/1
1 TABLET ORAL EVERY 12 HOURS PRN
Qty: 60 TABLET | Refills: 0 | Status: SHIPPED | OUTPATIENT
Start: 2022-05-18 | End: 2022-05-23 | Stop reason: SDUPTHER

## 2022-05-19 ENCOUNTER — TELEPHONE (OUTPATIENT)
Dept: PAIN MEDICINE | Facility: CLINIC | Age: 62
End: 2022-05-19
Payer: OTHER GOVERNMENT

## 2022-05-19 NOTE — TELEPHONE ENCOUNTER
We can do a virtual visit, he would need to be in the [facility]

## 2022-05-19 NOTE — TELEPHONE ENCOUNTER
----- Message from Sharla Meyer sent at 5/19/2022  4:54 PM CDT -----  Contact: AKIN WAYNE [[id number]][phone]  Type:  Sooner Appointment Request    Caller is requesting a sooner appointment.  Caller declined first available appointment listed below.  Caller will not accept being placed on the waitlist and is requesting a message be sent to doctor.    Name of Caller: AKIN WAYNE [[id number]]  When is the first available appointment? 6/22/22  Reason for Visit: F/U  Would the patient rather a call back or a response via [facility]? Phone call   Best Call Back Number: [phone]  Additional Information: Patient prefers virtual, as early as possible. Requested an appointment on or around 6/15/22.

## 2022-05-20 ENCOUNTER — TELEPHONE (OUTPATIENT)
Dept: PAIN MEDICINE | Facility: CLINIC | Age: 62
End: 2022-05-20
Payer: OTHER GOVERNMENT

## 2022-05-20 NOTE — TELEPHONE ENCOUNTER
Spoke with pt to schedule f/u. States he will be able to be in the [facility] for virtual visit. appt scheduled, pt verbalized understanding.

## 2022-05-20 NOTE — TELEPHONE ENCOUNTER
----- Message from Rios Vargas sent at 5/20/2022  9:34 AM CDT -----  Contact: Self  Type:  Patient Returning Call    Who Called:  Patient  Who Left Message for Patient:  Tena  Does the patient know what this is regarding?:  Yes  Best Call Back Number:  [phone]   Additional Information:

## 2022-05-23 ENCOUNTER — PATIENT MESSAGE (OUTPATIENT)
Dept: PAIN MEDICINE | Facility: CLINIC | Age: 62
End: 2022-05-23

## 2022-05-23 ENCOUNTER — OFFICE VISIT (OUTPATIENT)
Dept: PAIN MEDICINE | Facility: CLINIC | Age: 62
End: 2022-05-23
Payer: OTHER GOVERNMENT

## 2022-05-23 ENCOUNTER — TELEPHONE (OUTPATIENT)
Dept: PAIN MEDICINE | Facility: CLINIC | Age: 62
End: 2022-05-23

## 2022-05-23 DIAGNOSIS — G89.4 CHRONIC PAIN DISORDER: ICD-10-CM

## 2022-05-23 DIAGNOSIS — M48.02 CERVICAL SPINAL STENOSIS: ICD-10-CM

## 2022-05-23 DIAGNOSIS — R91.8 LUNG MASS: ICD-10-CM

## 2022-05-23 DIAGNOSIS — M54.12 CERVICAL RADICULOPATHY: ICD-10-CM

## 2022-05-23 DIAGNOSIS — R53.81 DEBILITY: Primary | ICD-10-CM

## 2022-05-23 PROCEDURE — 99213 PR OFFICE/OUTPT VISIT, EST, LEVL III, 20-29 MIN: ICD-10-PCS | Mod: 95,,, | Performed by: ANESTHESIOLOGY

## 2022-05-23 PROCEDURE — 99213 OFFICE O/P EST LOW 20 MIN: CPT | Mod: 95,,, | Performed by: ANESTHESIOLOGY

## 2022-05-23 RX ORDER — OXYCODONE AND ACETAMINOPHEN 5; 325 MG/1; MG/1
1 TABLET ORAL EVERY 12 HOURS PRN
Qty: 75 TABLET | Refills: 0 | Status: SHIPPED | OUTPATIENT
Start: 2022-08-16 | End: 2022-06-13 | Stop reason: SDUPTHER

## 2022-05-23 RX ORDER — OXYCODONE AND ACETAMINOPHEN 5; 325 MG/1; MG/1
1 TABLET ORAL EVERY 12 HOURS PRN
Qty: 75 TABLET | Refills: 0 | Status: SHIPPED | OUTPATIENT
Start: 2022-07-17 | End: 2022-06-13 | Stop reason: SDUPTHER

## 2022-05-23 RX ORDER — OXYCODONE AND ACETAMINOPHEN 5; 325 MG/1; MG/1
1 TABLET ORAL EVERY 12 HOURS PRN
Qty: 75 TABLET | Refills: 0 | Status: SHIPPED | OUTPATIENT
Start: 2022-06-17 | End: 2022-06-13 | Stop reason: SDUPTHER

## 2022-05-23 RX ORDER — LIDOCAINE 50 MG/G
PATCH TOPICAL DAILY
Qty: 30 PATCH | Refills: 6 | Status: SHIPPED | OUTPATIENT
Start: 2022-05-23 | End: 2022-08-23 | Stop reason: SDUPTHER

## 2022-05-23 NOTE — TELEPHONE ENCOUNTER
Call placed to Pt to assist with scheduling 3 month in office f/u appt. Date and times available provided. Pt questioned why it need to be in person and  states he wishes to contact Dr. Campbell prior to scheduling He will reach out to us once he has heard back.

## 2022-05-23 NOTE — PROGRESS NOTES
This note was completed with dictation software and grammatical errors may exist.    The patient location is: [place], home    Visit type: audiovisual    Face to Face time with patient: 8  13 minutes of total time spent on the encounter, which includes face to face time and non-face to face time preparing to see the patient (eg, review of tests), Obtaining and/or reviewing separately obtained history, Documenting clinical information in the electronic or other health record, Independently interpreting results (not separately reported) and communicating results to the patient/family/caregiver, or Care coordination (not separately reported).         Each patient to whom he or she provides medical services by telemedicine is:  (1) informed of the relationship between the physician and patient and the respective role of any other health care provider with respect to management of the patient; and (2) notified that he or she may decline to receive medical services by telemedicine and may withdraw from such care at any time.    Notes:       CC:  Neck pain, bilateral hand pain, back pain, lower leg pain    HPI:  The patient is a 61-year-old man with a history of cryptogenic organizing pneumonia, cervical fusion, chronic migraine headaches, pituitary tumor who presents in self-referral for neck pain and back pain.  He returns in follow-up for virtual visit.  He states that since I had last seen him, his pulmonologist noted another lung mass, he is going to be getting a PET scan in June.  He does feel that his shortness of breath is slightly worse, uses some home oxygen but apparently does not have portable oxygen.  States that he will see his pulmonologist later in June.    Since I had last seen him, but a month ago he fell, hurt his right forearm, states that he actually had fevers chills, hypotension and was seen in the emergency department, thinks that he had sepsis but he was not admitted and took antibiotics that he  had from something previously and his hematologist also gave him some Levaquin as well.  He feels that this wound is improving, has a wound care nurse.        Previous History:  The patient reports having a long history of neck pain, bilateral upper back pain, chronic migraine headaches particularly on the left side and low back pain with bilateral lower leg numbness and tingling.  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  He has had to change physicians regularly because of this.  He states that he was having severe neck pain and numbness and tingling down his left arm in 2016, ended up having a C6/7 ACDF but apparently there was a posterior approach as well.  He states that the symptoms did improve somewhat but in the last 2 years have become severely worse.  He reports that due to his surgery he did have some recurrent laryngeal nerve issue and has had difficulty with swallowing and speaking.  He states that the neck pain, upper back pain and bilateral shoulder pain has worsened in the last several years but states that he has not seen a physician about this.  He states that he has weakness in his arms, weakness in his legs and the pain is getting so severe that he feels that he cannot live life and has even considered suicide.  He had seen a pain management physician several years ago in [place] but they had recommended injections any decided not to do that.  For the last several years he has not seen anybody about this.    In terms of his low back pain is across bilateral low back, worse with standing and walking improved with sitting down.  He also has pain in the lower legs and feet with numbness, burning and aching.  The pain is worse with prolonged sitting worse with getting out of a bed or a chair.      He also has a history of chronic migraines that last anywhere  from 8-24 hours.  He has recently started seeing a neurologist who is giving him Aimovig and this seems to be helping.    Pain intervention history:  No history of injections    Spine surgeries:  He had undergone anterior and posterior cervical surgeries in about 2016, no surgery on his lumbar spine    Antineuropathics:  Gabapentin and Lyrica caused imbalance issues  NSAIDs:  Motrin provide slight relief but causes swelling, Tylenol no benefit.  Lidocaine patch, Voltaren gel slight benefit.  Physical therapy:  Has not done any physical therapy recently.  Antidepressants:  Muscle relaxers:  Muscle relaxants cause imbalance  Opioids:  Percocet has helped in the past  Antiplatelets/Anticoagulants:        ROS:  He reports weight loss, weakness, depression, suicidal ideation.  Balance of review of systems is negative.    No results found for: LABA1C, HGBA1C    No results found for: WBC, HGB, HCT, MCV, PLT          Past Medical History:   Diagnosis Date    H/O: pituitary tumor     Thyroid disease     Ventricular tachycardia        Past Surgical History:   Procedure Laterality Date    history of cervical fusion      transphenoidal pituitary tumor resection         Social History     Socioeconomic History    Marital status:    Tobacco Use    Smoking status: Never Smoker    Smokeless tobacco: Never Used   Substance and Sexual Activity    Alcohol use: Never    Drug use: Never         Medications/Allergies: See med card    There were no vitals filed for this visit.      Physical exam:  Gen: A and O x3, pleasant, well-groomed        Imaging:  MRI lumbar spine outside institution, I reviewed this personally with the patient.  There is some mild facet arthropathy at L3/4, L4/5 and L5/S1.  Mild broad base bulging at L4/5 with no canal narrowing but mild to moderate foraminal narrowing left slightly greater than right.    MRI cervical spine 12/02/2020 outside institution:  I reviewed these images personally with  the patient.  This shows previous fusion at C6/7, facet arthropathy at multiple levels, does have canal narrowing due to posterior ligamentous hypertrophy and disc bulging at C3/4.  There is no cord signal change.  He reports having an EMG of the lower extremities, none of the upper extremities.    Assessment:   The patient is a 61-year-old man with a history of cryptogenic organizing pneumonia, cervical fusion, chronic migraine headaches, pituitary tumor who presents in self-referral for neck pain and back pain.    1. Debility     2. Chronic pain disorder     3. Cervical radiculopathy     4. Cervical spinal stenosis     5. Lung mass           Plan:  1.  I have refilled his Percocet, also lidocaine patches.  Will increase the amount of Percocet he is allowed to take each month, he generally takes this twice a day but has broken 1 pill in half to take half in the morning half during the day and takes in the entire pill at night since the pain is most severe at that time.  I am going to allow him to take # 75 tablets a month, I will send this to his pharmacy.  2. We will try to get coverage for a TENS unit, muscle stimulator he has disuse atrophy, pain in the cervical and scapular region, we will need to get this approved through the [employer].  I will have him follow up in 3 months or sooner as needed.

## 2022-05-23 NOTE — TELEPHONE ENCOUNTER
Scheduling pt for 3 mo follow up. appt today with Dr. Campbell was a virtual, ok to schedule virtual follow up with you?

## 2022-06-13 ENCOUNTER — PATIENT MESSAGE (OUTPATIENT)
Dept: PAIN MEDICINE | Facility: CLINIC | Age: 62
End: 2022-06-13
Payer: OTHER GOVERNMENT

## 2022-06-13 RX ORDER — OXYCODONE AND ACETAMINOPHEN 5; 325 MG/1; MG/1
1 TABLET ORAL EVERY 8 HOURS PRN
Qty: 75 TABLET | Refills: 0 | Status: SHIPPED | OUTPATIENT
Start: 2022-06-13 | End: 2022-07-15 | Stop reason: SDUPTHER

## 2022-06-13 RX ORDER — OXYCODONE AND ACETAMINOPHEN 5; 325 MG/1; MG/1
1 TABLET ORAL EVERY 8 HOURS PRN
Qty: 75 TABLET | Refills: 0 | Status: SHIPPED | OUTPATIENT
Start: 2022-06-13 | End: 2022-07-13

## 2022-06-13 NOTE — TELEPHONE ENCOUNTER
Pt requesting to move Rxs to [facility]. Correct pharmacy listed. Please advise if ok and we can cancel at [facility]. Thanks!

## 2022-07-13 ENCOUNTER — TELEPHONE (OUTPATIENT)
Dept: ENDOCRINOLOGY | Facility: CLINIC | Age: 62
End: 2022-07-13
Payer: OTHER GOVERNMENT

## 2022-07-13 NOTE — TELEPHONE ENCOUNTER
----- Message from Gaudencio Guerrero MA sent at 7/13/2022  2:43 PM CDT -----  Regarding: FW: advice  Contact: patient    ----- Message -----  From: Della Soares  Sent: 7/13/2022   2:40 PM CDT  To: [facility] Staff  Subject: advice                                           Type: Needs Medical Advice  Who Called:  patient  Symptoms (please be specific):    How long has patient had these symptoms:    Pharmacy name and phone #:    Best Call Back Number: [phone] (home)   Additional Information: Patient is calling regarding the human growth hormones and the associated CPT codes. Please call patient to advise.Thanks!

## 2022-07-13 NOTE — TELEPHONE ENCOUNTER
Patient says that he needs a hgh test. He has the drug needed. He wants to know when the nurse does the test he wants the cpt code from nurse and a prior autho from nurse so insurance will cover. Please advise.

## 2022-07-14 NOTE — TELEPHONE ENCOUNTER
Looks like he is talking about Lillian.      Steps: Prepare solution  Pt drinks solution    Blood draw at 30, 45, 60, 90 minutes after pt drinks it.  Done.    Please check with management about what CPT codes are used for nurse visits associated with mixing a medication and 4 blood draws, since I don't do them.

## 2022-07-15 ENCOUNTER — PATIENT MESSAGE (OUTPATIENT)
Dept: ENDOCRINOLOGY | Facility: CLINIC | Age: 62
End: 2022-07-15
Payer: OTHER GOVERNMENT

## 2022-07-15 DIAGNOSIS — E16.2 HYPOGLYCEMIA: Primary | ICD-10-CM

## 2022-07-15 RX ORDER — OXYCODONE AND ACETAMINOPHEN 5; 325 MG/1; MG/1
1 TABLET ORAL EVERY 8 HOURS PRN
Qty: 75 TABLET | Refills: 0 | Status: SHIPPED | OUTPATIENT
Start: 2022-07-15 | End: 2022-08-14

## 2022-07-15 RX ORDER — LANCETS
EACH MISCELLANEOUS
Qty: 100 EACH | Refills: 11 | Status: SHIPPED | OUTPATIENT
Start: 2022-07-15 | End: 2023-01-04 | Stop reason: SDUPTHER

## 2022-07-15 RX ORDER — INSULIN PUMP SYRINGE, 3 ML
EACH MISCELLANEOUS
Qty: 1 EACH | Refills: 0 | Status: SHIPPED | OUTPATIENT
Start: 2022-07-15

## 2022-07-15 NOTE — TELEPHONE ENCOUNTER
Doesn't need to check sugar with any particular interval/frequency. But with symptoms like that he can check sugar on occasion if/when having symptoms.  Ideally keep track of sugar level, symptoms, what he ate last and when.    Okay to let patient know glucose testing supplies sent to the [facility] pharmacy.

## 2022-07-15 NOTE — TELEPHONE ENCOUNTER
Informed Mr. Olivo that [facility]'s policy is that patients are not allowed to bring medication into the clinic to be administered & that it should come directly to us from the pharmacy & that management is reviewing the process to see if they can make an exception & that we will inform him as soon as a decision is made.     He also c/o feeling weak & lightheaded before & after meals frequently for a 2-3 month period & that his BS was in the 50's on 6/1/22 & 7/12/22 & wants to know should he be checking his BS regularly? If so, he will need orders for a glucometer kit.

## 2022-07-17 ENCOUNTER — PATIENT MESSAGE (OUTPATIENT)
Dept: PAIN MEDICINE | Facility: CLINIC | Age: 62
End: 2022-07-17
Payer: OTHER GOVERNMENT

## 2022-07-26 ENCOUNTER — PATIENT MESSAGE (OUTPATIENT)
Dept: ENDOCRINOLOGY | Facility: CLINIC | Age: 62
End: 2022-07-26
Payer: OTHER GOVERNMENT

## 2022-07-26 DIAGNOSIS — E23.0 HYPOPITUITARISM: ICD-10-CM

## 2022-07-26 DIAGNOSIS — E03.9 HYPOTHYROIDISM, UNSPECIFIED TYPE: Primary | ICD-10-CM

## 2022-07-26 DIAGNOSIS — E29.1 HYPOGONADISM MALE: ICD-10-CM

## 2022-07-27 RX ORDER — SILDENAFIL 100 MG/1
100 TABLET, FILM COATED ORAL DAILY PRN
Qty: 30 TABLET | Refills: 5 | Status: SHIPPED | OUTPATIENT
Start: 2022-07-27 | End: 2023-01-04 | Stop reason: SDUPTHER

## 2022-07-27 RX ORDER — TESTOSTERONE CYPIONATE 200 MG/ML
200 INJECTION, SOLUTION INTRAMUSCULAR
Qty: 3 ML | Refills: 2 | Status: SHIPPED | OUTPATIENT
Start: 2022-07-27 | End: 2022-07-28

## 2022-07-27 NOTE — TELEPHONE ENCOUNTER
Last seen 2/17/2022    On 200 mg q10 days testosterone.    Should have had repeat labs by now.   if needed, new orders in (for quest to make them print out ideally on the same page). Can send to patient if needed.      His other message:  The endocrinologist at [facility], Dr Montez May is  willing to perform the Macrilen testing if you will send him an ENDOCRINOLOGY CONSULT and indicate it for metabolic testing.  This way he can do the metabolic test and send you the results.  - Please include what test he wants done, how to perform, and a fax number to get the results back to you.    You can mail me or email the consult and I can get my wife to hand deliver it to DR May.  Thanks    Metabolic testing: Macrilen testing to evaluate for growth hormone deficiency.   Additional details available here: [email or web address]    Protocol:   1. Verify patient details. Confirm 8-hour fast and then weigh the patient (kg). Determine the number of Macrilen pouches needed to prepare the dose. ?120 kg=1 pouch, over 120 kg=2 pouches    2. Dissolve Macrilen granules in water. Fill a beaker with the appropriate volume of water. Tap water can be used. Add the granules into the beaker to dissolve. 1 pouch=120 mL water. 2 zjzhhrt=760 mL water.    3. Stir gently 2 to 3 minutes. A small amount of undissolved particles will remain. The final concentration is 0.5 mg/mL. Use the Macrilen solution within 30 minutes of preparation.    4. Determine the recommended dose to be administered by multiplying the patient weight in kilograms by 0.5 mg/kg. Example: A patient weighing 70 kg will need a 35-mg dose of reconstituted Macrilen solution. Note: 2.2 lb=1 kg.1    5. Determine and measure the exact volume. Determine the volume of prepared Macrilen solution to be administered by dividing the recommended dose by 0.5 mg/mL. Draw up the exact volume of reconstituted solution  in milliliters using the dose-measurement syringe.    6. Transfer the exact required volume of Macrilen solution into a drinking cup. Reconstituted solution is stable for up to 30 minutes. Discard any leftover solution.    7. Administering the test. The patient must drink the Macrilen solution within 30 seconds and then be observed for the duration of the test.    8. No baseline blood draw is required with Macrilen. After administering the test, draw venous blood samples at 30, 45, 60, and 90 minutes. Measure growth hormone on these blood samples.    9. Diagnose. After preparing the serum samples according to instructions from the lab (where samples will be sent), you will receive the growth hormone determinations.    Fax number: [phone]    Order signed.    Testosterone refill printed, signed.

## 2022-07-28 RX ORDER — TESTOSTERONE CYPIONATE 200 MG/ML
200 INJECTION, SOLUTION INTRAMUSCULAR
Qty: 3 ML | Refills: 2 | Status: SHIPPED | OUTPATIENT
Start: 2022-07-28 | End: 2023-01-04 | Stop reason: SDUPTHER

## 2022-07-29 ENCOUNTER — PATIENT MESSAGE (OUTPATIENT)
Dept: ENDOCRINOLOGY | Facility: CLINIC | Age: 62
End: 2022-07-29
Payer: OTHER GOVERNMENT

## 2022-07-29 ENCOUNTER — PATIENT MESSAGE (OUTPATIENT)
Dept: PAIN MEDICINE | Facility: CLINIC | Age: 62
End: 2022-07-29
Payer: OTHER GOVERNMENT

## 2022-07-29 NOTE — TELEPHONE ENCOUNTER
Testosterone Rx placed in the mail. Wants to know should he come to his 8/16/22 appt since he has not gotten his Macrilen Growth HormoneTest? If he should come, what labs are required prior to the visit?    Reports his coverage will run out in September for ENDO visits & he is trying to get the Macrilen Growth Hormone Test done somewhere else.

## 2022-08-02 NOTE — TELEPHONE ENCOUNTER
Okay to reschedule per pt preferences.    Lab orders in from 7/27/2022, okay to print and mail to him.    Last seen 2/17/2022. Okay to reschedule next appointment if needed.

## 2022-08-02 NOTE — TELEPHONE ENCOUNTER
Spoke with Mr. Olivo & mailed 7/27/22 lab as ordered per Dr. Quinn. Mr. Olivo reports he is trying to get his Macrilen Growth Hormone Test done this week & will keep his 8/16/22 appt. If he cannot get the Macrilen Growth Hormone Test done prior to 8/16/22 he will cancel the appt. & notify us via the patient portal as to when to reschedule.

## 2022-08-04 ENCOUNTER — TELEPHONE (OUTPATIENT)
Dept: ENDOCRINOLOGY | Facility: CLINIC | Age: 62
End: 2022-08-04

## 2022-08-04 NOTE — TELEPHONE ENCOUNTER
Got labs.    8/1/2022, 4pm    HGB 11.8, HCT 35.5    TSH 1.6  Free t4 1.07    Testosterone 1932 (normal up to 948)    Ca 8.4  Gluc 118    GFR normal    Review at appointment

## 2022-08-09 DIAGNOSIS — E23.0 HYPOPITUITARISM: ICD-10-CM

## 2022-08-09 DIAGNOSIS — E29.1 HYPOGONADISM IN MALE: Primary | ICD-10-CM

## 2022-08-10 ENCOUNTER — PATIENT MESSAGE (OUTPATIENT)
Dept: PAIN MEDICINE | Facility: CLINIC | Age: 62
End: 2022-08-10
Payer: OTHER GOVERNMENT

## 2022-08-15 NOTE — PROGRESS NOTES
Subjective:      Chief Complaint: Hypopituitarism    The patient location is: pt reported drive to [place]  The chief complaint leading to consultation is: pituitary    Visit type: audiovisual    Face to Face time with patient: 14 minutes  19 minutes of total time spent on the encounter, which includes face to face time and non-face to face time preparing to see the patient (eg, review of tests), Obtaining and/or reviewing separately obtained history, Documenting clinical information in the electronic or other health record, Independently interpreting results (not separately reported) and communicating results to the patient/family/caregiver, or Care coordination (not separately reported).     Each patient to whom he or she provides medical services by telemedicine is:  (1) informed of the relationship between the physician and patient and the respective role of any other health care provider with respect to management of the patient; and (2) notified that he or she may decline to receive medical services by telemedicine and may withdraw from such care at any time.    Notes:    HPI: Evan Olivo is a 61 y.o. male who is having a follow-up evaluation for pituitary, hypogonadism.   Last seen 2/17/2022    Pt noted hx pituitary tumor.  Surgery 2016. Recurrence, s/p radiation around 2018.     Last MRI 2022: No residual tumor, post-operative changes, stable findings from prior MRI.  Prior MRI 2020: No residual tumor. Post-op changes.     Has hypothyroidism:   Medication: 75 mcg/day levothyroxine   labs 8/2022 TSH 1.74, free t4 1.18     2/3/2022 TSH 2.8, free t4 1.2, normal     Hypogonadism:  Notes dx around 8 years ago.  On testosterone replacement with gel, then injections. Various doses.    Currently 100 mg q10 days.    Last level was 689 from 8/10/22  Last PSA normal, CBC okay    Last DXA 11/2020. Osteopenia.  Fractures: ankle. No trauma.    Reclast summer 2020, summer 2021 (around July)    Older labs:   WBC  <2  Anemia, hemoglobin 10  Platelets normal  PSA 0.86  Cortisol 13.6 (lab from 7/27/2021 at 805am)   Testosterone 652  IGF 45 (low)    Had recommended GH testing, not done. Still working on getting that protocol approved here or closer to patient home       Today, pt reports feeling about the same.    Testosterone symptoms reasonable  Weight pretty stable.    +constipation, chronic.  +fatigue.    +occasional headaches  Sometimes vision changes.  +balance problems, sometimes falls. Uses walker/cane when able    Dry mouth, taste problems. Occasional nocturia a few times a week.    Working on diet, trying to do protein shakes.    Reviewed past medical, family, social history and updated as appropriate.    Review of Systems  As above    Objective:   Previous vitals:  BP Readings from Last 5 Encounters:   04/28/21 102/64   02/18/21 (!) 98/56   02/18/21 (!) 98/56 02/18/21 (!) 98/56     Physical Exam  Constitutional:       General: he is not in acute distress.  Pulmonary:     Effort: Pulmonary effort is normal.    Wt Readings from Last 10 Encounters:   05/06/21 1519 49 kg (108 lb)   04/28/21 0812 49 kg (108 lb 0.4 oz)   02/18/21 1348 49 kg (108 lb 0.4 oz)   02/18/21 0911 49.2 kg (108 lb 7.5 oz)   02/18/21 0749 49.2 kg (108 lb 9.2 oz)     No results found for: HGBA1C  No results found for: CHOL, HDL, LDLCALC, TRIG, CHOLHDL  No results found for: NA, K, CL, CO2, GLU, BUN, CREATININE, CALCIUM, PROT, ALBUMIN, BILITOT, ALKPHOS, AST, ALT, ANIONGAP, ESTGFRAFRICA, EGFRNONAA, TSH     Assessment/Plan:     Hypopituitarism  Hx pituitary tumor several years ago.   s/p surgery   then recurrence   now s/p brain radiation   - MRI 2022 without residual tumor      - did find potential abnormality on the right side of the clivus near ICA, stable from 2018. Recommend pt f/u with PCP, can consider CT neck for further investigation there   - evaluate/treat pituitary hormones per below      Low IGF-1 level  With hypopituitarism. Concerning for  growth hormone deficiency.   clinically, has fatigue. Treatment can help symptoms and also some benefit in bone density.   want confirmation test before considering risks vs benefits of treatment: Growth hormone stimulation testing.  Trying for macimorelin testing. Having issues with protocol approval. Pt aiming to get testing done locally, encourage him to reach out if not able and can see if pharmacy can make an exception  After testing, will consider GH supplementation if indicated (and if covered by insurance)      Hypogonadism in male  Long hx hypogonadism, pt noted this was a problem before pituitary tumor was found. Don't have labs/records to be sure if the cause was from the tumor or something else   either way, he has been on supplementation for a while. Gel then shot. Various doses over the years, didn't feel as good at q14 day dosing   now on 200 mg q10 days   last level normal. Mid of normal range, pt not sure how long between lab and shot if it was a mid-way draw vs drawn right before a shot. If mid-point, okay to continue same. If it was right before a shot, dose may be a bit too high.   CBC, last PSA okay, continue same dose. 200 mg q10 days testosterone   - recheck levels in 3-6 months      Hypothyroidism  On 75 mcg/day   - last labs okay   - continue 75 mcg/day levothyroxine   - continue to monitor 1-2 times a year with labs.       Osteopenia of multiple sites  Last DXA 11/2020, osteopenia   - pt noted hx ankle fracture without trauma   - s/p reclast summer 2020, 2021. Not clear if he got a dose this year yet.   - repeat DXA next time.   - ensure adequate calcium and vitamin D intake   monitor        Follow up in about 6 months (around 2/16/2023) for further monitoring, lab review, imaging review.      Daniel Quinn MD  Endocrinology

## 2022-08-16 ENCOUNTER — PATIENT MESSAGE (OUTPATIENT)
Dept: PAIN MEDICINE | Facility: CLINIC | Age: 62
End: 2022-08-16
Payer: OTHER GOVERNMENT

## 2022-08-16 ENCOUNTER — OFFICE VISIT (OUTPATIENT)
Dept: ENDOCRINOLOGY | Facility: CLINIC | Age: 62
End: 2022-08-16
Payer: OTHER GOVERNMENT

## 2022-08-16 DIAGNOSIS — E29.1 HYPOGONADISM IN MALE: ICD-10-CM

## 2022-08-16 DIAGNOSIS — M85.89 OSTEOPENIA OF MULTIPLE SITES: ICD-10-CM

## 2022-08-16 DIAGNOSIS — E03.2 HYPOTHYROIDISM DUE TO MEDICATION: ICD-10-CM

## 2022-08-16 DIAGNOSIS — E23.0 HYPOPITUITARISM: ICD-10-CM

## 2022-08-16 DIAGNOSIS — R91.8 PULMONARY NODULES: Primary | ICD-10-CM

## 2022-08-16 DIAGNOSIS — R79.89 LOW IGF-1 LEVEL: ICD-10-CM

## 2022-08-16 PROCEDURE — 99214 PR OFFICE/OUTPT VISIT, EST, LEVL IV, 30-39 MIN: ICD-10-PCS | Mod: 95,,, | Performed by: INTERNAL MEDICINE

## 2022-08-16 PROCEDURE — 99214 OFFICE O/P EST MOD 30 MIN: CPT | Mod: 95,,, | Performed by: INTERNAL MEDICINE

## 2022-08-16 RX ORDER — OXYCODONE AND ACETAMINOPHEN 5; 325 MG/1; MG/1
1 TABLET ORAL EVERY 8 HOURS PRN
Qty: 75 TABLET | Refills: 0 | Status: SHIPPED | OUTPATIENT
Start: 2022-08-16 | End: 2022-08-23 | Stop reason: SDUPTHER

## 2022-08-16 NOTE — ASSESSMENT & PLAN NOTE
Hx pituitary tumor several years ago.   s/p surgery   then recurrence   now s/p brain radiation   - MRI 2022 without residual tumor      - did find potential abnormality on the right side of the clivus near ICA, stable from 2018. Recommend pt f/u with PCP, can consider CT neck for further investigation there   - evaluate/treat pituitary hormones per below

## 2022-08-16 NOTE — ASSESSMENT & PLAN NOTE
With hypopituitarism. 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   clinically, has fatigue. Treatment can help symptoms and also some benefit in bone density.   want confirmation test before considering risks vs benefits of treatment: Growth hormone stimulation testing.  Trying for macimorelin testing. Having issues with protocol approval. Pt aiming to get testing done locally, encourage him to reach out if not able and can see if pharmacy can make an exception  After testing, will consider GH supplementation if indicated (and if covered by insurance)

## 2022-08-16 NOTE — ASSESSMENT & PLAN NOTE
On 75 mcg/day   - last labs okay   - continue 75 mcg/day levothyroxine   - continue to monitor 1-2 times a year with labs.

## 2022-08-16 NOTE — TELEPHONE ENCOUNTER
When patient's request a certain pharmacy, please change the pharmacy in the chart to reflect this

## 2022-08-16 NOTE — ASSESSMENT & PLAN NOTE
Last DXA 11/2020, osteopenia   - pt noted hx ankle fracture without trauma   - s/p reclast summer 2020, 2021. Not clear if he got a dose this year yet.   - repeat DXA next time.   - ensure adequate calcium and vitamin D intake   monitor

## 2022-08-16 NOTE — ASSESSMENT & PLAN NOTE
Long hx hypogonadism, pt noted this was a problem before pituitary tumor was found. Don't have labs/records to be sure if the cause was from the tumor or something else   either way, he has been on supplementation for a while. Gel then shot. Various doses over the years, didn't feel as good at q14 day dosing   now on 200 mg q10 days   last level normal. Mid of normal range, pt not sure how long between lab and shot if it was a mid-way draw vs drawn right before a shot. If mid-point, okay to continue same. If it was right before a shot, dose may be a bit too high.   CBC, last PSA okay, continue same dose. 200 mg q10 days testosterone   - recheck levels in 3-6 months

## 2022-08-17 ENCOUNTER — TELEPHONE (OUTPATIENT)
Dept: ENDOCRINOLOGY | Facility: CLINIC | Age: 62
End: 2022-08-17
Payer: OTHER GOVERNMENT

## 2022-08-17 NOTE — TELEPHONE ENCOUNTER
----- Message from Benito Watkins sent at 8/17/2022  1:07 PM CDT -----  Type:  Patient Returning Call    Who Called:  Pt  Who Left Message for Patient:  Sofia  Does the patient know what this is regarding?:  appt  Best Call Back Number:  [phone] (home)     Additional Information:  sts he made the appt yesterday.   Please advise -- Thank you

## 2022-08-18 ENCOUNTER — TELEPHONE (OUTPATIENT)
Dept: ENDOCRINOLOGY | Facility: CLINIC | Age: 62
End: 2022-08-18
Payer: OTHER GOVERNMENT

## 2022-08-18 NOTE — TELEPHONE ENCOUNTER
Pulmonology referral: spoke with patient. Asks if insurance has approved referral. Explained Dr. Quinn has put referral into [facility] system, but he should  contact [facility] to be assured. He mentions Dr.Cody Frances as suggestion made by . Explained I don't schedule for , but can send, via portal,  his information. We agreed that after verifying with insurance referral is authorized, he will contact  for apopintment or me to schedule within [facility]. All information sent via portal. He thanks for the call.

## 2022-08-23 ENCOUNTER — OFFICE VISIT (OUTPATIENT)
Dept: PAIN MEDICINE | Facility: CLINIC | Age: 62
End: 2022-08-23
Payer: OTHER GOVERNMENT

## 2022-08-23 ENCOUNTER — PATIENT MESSAGE (OUTPATIENT)
Dept: ENDOCRINOLOGY | Facility: CLINIC | Age: 62
End: 2022-08-23
Payer: OTHER GOVERNMENT

## 2022-08-23 DIAGNOSIS — M48.02 CERVICAL SPINAL STENOSIS: ICD-10-CM

## 2022-08-23 DIAGNOSIS — R53.81 DEBILITY: Primary | ICD-10-CM

## 2022-08-23 DIAGNOSIS — M54.12 CERVICAL RADICULOPATHY: ICD-10-CM

## 2022-08-23 DIAGNOSIS — R91.8 LUNG MASS: ICD-10-CM

## 2022-08-23 DIAGNOSIS — G89.4 CHRONIC PAIN DISORDER: ICD-10-CM

## 2022-08-23 DIAGNOSIS — M47.816 LUMBAR SPONDYLOSIS: ICD-10-CM

## 2022-08-23 DIAGNOSIS — R26.81 UNSTEADY GAIT: ICD-10-CM

## 2022-08-23 PROCEDURE — 99213 OFFICE O/P EST LOW 20 MIN: CPT | Mod: 95,,, | Performed by: ANESTHESIOLOGY

## 2022-08-23 PROCEDURE — 99213 PR OFFICE/OUTPT VISIT, EST, LEVL III, 20-29 MIN: ICD-10-PCS | Mod: 95,,, | Performed by: ANESTHESIOLOGY

## 2022-08-23 RX ORDER — OXYCODONE AND ACETAMINOPHEN 5; 325 MG/1; MG/1
1 TABLET ORAL EVERY 8 HOURS PRN
Qty: 75 TABLET | Refills: 0 | Status: SHIPPED | OUTPATIENT
Start: 2022-11-14 | End: 2022-10-31 | Stop reason: SDUPTHER

## 2022-08-23 RX ORDER — OXYCODONE AND ACETAMINOPHEN 5; 325 MG/1; MG/1
1 TABLET ORAL EVERY 8 HOURS PRN
Qty: 75 TABLET | Refills: 0 | Status: SHIPPED | OUTPATIENT
Start: 2022-10-15 | End: 2022-09-16 | Stop reason: SDUPTHER

## 2022-08-23 RX ORDER — LIDOCAINE 50 MG/G
PATCH TOPICAL DAILY
Qty: 30 PATCH | Refills: 6 | Status: SHIPPED | OUTPATIENT
Start: 2022-08-23 | End: 2022-11-29 | Stop reason: SDUPTHER

## 2022-08-23 RX ORDER — DICLOFENAC SODIUM 10 MG/G
GEL TOPICAL 3 TIMES DAILY PRN
Qty: 1 EACH | Refills: 5 | Status: SHIPPED | OUTPATIENT
Start: 2022-08-23 | End: 2022-11-29 | Stop reason: SDUPTHER

## 2022-08-23 RX ORDER — OXYCODONE AND ACETAMINOPHEN 5; 325 MG/1; MG/1
1 TABLET ORAL EVERY 8 HOURS PRN
Qty: 75 TABLET | Refills: 0 | Status: SHIPPED | OUTPATIENT
Start: 2022-12-14 | End: 2022-10-31 | Stop reason: SDUPTHER

## 2022-08-23 NOTE — PROGRESS NOTES
This note was completed with dictation software and grammatical errors may exist.    The patient location is: [place], home    Visit type: audiovisual    Face to Face time with patient: 10  21 minutes of total time spent on the encounter, which includes face to face time and non-face to face time preparing to see the patient (eg, review of tests), Obtaining and/or reviewing separately obtained history, Documenting clinical information in the electronic or other health record, Independently interpreting results (not separately reported) and communicating results to the patient/family/caregiver, or Care coordination (not separately reported).     Each patient to whom he or she provides medical services by telemedicine is:  (1) informed of the relationship between the physician and patient and the respective role of any other health care provider with respect to management of the patient; and (2) notified that he or she may decline to receive medical services by telemedicine and may withdraw from such care at any time.      CC:  Neck pain, bilateral hand pain, back pain, lower leg pain    HPI:  The patient is a 61-year-old man with a history of cryptogenic organizing pneumonia, cervical fusion, chronic migraine headaches, pituitary tumor who presents in self-referral for neck pain and back pain.  He returns in follow-up for virtual visit.  He reports that currently he has been told that he has a new mass in his lung, now has 4 but states that they did not light up in PET scan from June.  Otherwise he denies any worsening shortness of breath.  Continues to have pain, worse when he does not take a nap during the day.  He states that his energy level is particularly low.  States that the pain is in his neck, left upper back.  He also has worsening low back pain that radiates into the bilateral hips worse with walking, causes difficulty with walking at times and even disrupts his sleep.  He continues to take Percocet  5/325 twice daily usually, sometimes more.  He states that this does help him continue to function.    Previous History:  The patient reports having a long history of neck pain, bilateral upper back pain, chronic migraine headaches particularly on the left side and low back pain with bilateral lower leg numbness and tingling.  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  He has had to change physicians regularly because of this.  He states that he was having severe neck pain and numbness and tingling down his left arm in 2016, ended up having a C6/7 ACDF but apparently there was a posterior approach as well.  He states that the symptoms did improve somewhat but in the last 2 years have become severely worse.  He reports that due to his surgery he did have some recurrent laryngeal nerve issue and has had difficulty with swallowing and speaking.  He states that the neck pain, upper back pain and bilateral shoulder pain has worsened in the last several years but states that he has not seen a physician about this.  He states that he has weakness in his arms, weakness in his legs and the pain is getting so severe that he feels that he cannot live life and has even considered suicide.  He had seen a pain management physician several years ago in [place] but they had recommended injections any decided not to do that.  For the last several years he has not seen anybody about this.    In terms of his low back pain is across bilateral low back, worse with standing and walking improved with sitting down.  He also has pain in the lower legs and feet with numbness, burning and aching.  The pain is worse with prolonged sitting worse with getting out of a bed or a chair.      He also has a history of chronic migraines that last anywhere from 8-24 hours.  He has recently started seeing a  neurologist who is giving him Aimovig and this seems to be helping.    Pain intervention history:  No history of injections    Spine surgeries:  He had undergone anterior and posterior cervical surgeries in about 2016, no surgery on his lumbar spine    Antineuropathics:  Gabapentin and Lyrica caused imbalance issues  NSAIDs:  Motrin provide slight relief but causes swelling, Tylenol no benefit.  Lidocaine patch, Voltaren gel slight benefit.  Physical therapy:  Has not done any physical therapy recently.  Antidepressants:  Muscle relaxers:  Muscle relaxants cause imbalance  Opioids:  Percocet has helped in the past  Antiplatelets/Anticoagulants:        ROS:  He reports weight loss, weakness, depression, suicidal ideation.  Balance of review of systems is negative.    No results found for: LABA1C, HGBA1C    No results found for: WBC, HGB, HCT, MCV, PLT          Past Medical History:   Diagnosis Date    H/O: pituitary tumor     Thyroid disease     Ventricular tachycardia        Past Surgical History:   Procedure Laterality Date    history of cervical fusion      transphenoidal pituitary tumor resection         Social History     Socioeconomic History    Marital status:    Tobacco Use    Smoking status: Never Smoker    Smokeless tobacco: Never Used   Substance and Sexual Activity    Alcohol use: Never    Drug use: Never         Medications/Allergies: See med card    There were no vitals filed for this visit.      Physical exam:  Gen: A and O x3, pleasant, well-groomed        Imaging:  MRI lumbar spine outside institution, I reviewed this personally with the patient.  There is some mild facet arthropathy at L3/4, L4/5 and L5/S1.  Mild broad base bulging at L4/5 with no canal narrowing but mild to moderate foraminal narrowing left slightly greater than right.    MRI cervical spine 12/02/2020 outside institution:  I reviewed these images personally with the patient.  This shows previous fusion at C6/7,  facet arthropathy at multiple levels, does have canal narrowing due to posterior ligamentous hypertrophy and disc bulging at C3/4.  There is no cord signal change.  He reports having an EMG of the lower extremities, none of the upper extremities.    Assessment:   The patient is a 61-year-old man with a history of cryptogenic organizing pneumonia, cervical fusion, chronic migraine headaches, pituitary tumor who presents in self-referral for neck pain and back pain.    1. Debility     2. Cervical radiculopathy  Ambulatory referral/consult to Physical/Occupational Therapy   3. Chronic pain disorder     4. Lung mass     5. Cervical spinal stenosis     6. Lumbar spondylosis  Ambulatory referral/consult to Physical/Occupational Therapy   7. Unsteady gait  Ambulatory referral/consult to Physical/Occupational Therapy         Plan:  He has fatigue, weakness in his legs but has not had any recent falls.  Nonetheless, I think he would be served by doing physical therapy, strengthening his legs so I will find a place near where he lives to get set up and will send the orders.    2. We discussed that if his back or neck pain should worsen he will contact me and we can discuss having him come to the office so I can evaluate him for possible interventional procedures.  3. Would like a 2nd opinion with the pulmonologist, had given him the number of [facility].  4.  I will have him follow up in 2-3 months or sooner as needed.  I have refilled his Percocet for the next 3 months.  He has not shown any signs of abuse or addiction.      The total time spent for evaluation and management today including reviewing separately obtained history, performing a medically appropriate exam and evaluation, documenting clinical information in the health record, independently interpreting results and communicating them to the patient/family/caregiver, and ordering medications/tests/procedures was between 20-29 minutes.

## 2022-09-16 ENCOUNTER — PATIENT MESSAGE (OUTPATIENT)
Dept: PAIN MEDICINE | Facility: CLINIC | Age: 62
End: 2022-09-16
Payer: OTHER GOVERNMENT

## 2022-09-18 RX ORDER — OXYCODONE AND ACETAMINOPHEN 5; 325 MG/1; MG/1
1 TABLET ORAL EVERY 8 HOURS PRN
Qty: 75 TABLET | Refills: 0 | Status: SHIPPED | OUTPATIENT
Start: 2022-09-18 | End: 2022-10-18

## 2022-10-28 ENCOUNTER — PATIENT MESSAGE (OUTPATIENT)
Dept: PAIN MEDICINE | Facility: CLINIC | Age: 62
End: 2022-10-28
Payer: OTHER GOVERNMENT

## 2022-10-28 ENCOUNTER — PATIENT MESSAGE (OUTPATIENT)
Dept: ENDOCRINOLOGY | Facility: CLINIC | Age: 62
End: 2022-10-28
Payer: OTHER GOVERNMENT

## 2022-10-28 DIAGNOSIS — E23.0 HYPOPITUITARISM: Primary | ICD-10-CM

## 2022-10-28 DIAGNOSIS — D35.2 BENIGN NEOPLASM OF PITUITARY GLAND: ICD-10-CM

## 2022-10-31 RX ORDER — OXYCODONE AND ACETAMINOPHEN 5; 325 MG/1; MG/1
1 TABLET ORAL EVERY 8 HOURS PRN
Qty: 75 TABLET | Refills: 0 | Status: SHIPPED | OUTPATIENT
Start: 2022-12-14 | End: 2023-01-13

## 2022-10-31 RX ORDER — OXYCODONE AND ACETAMINOPHEN 5; 325 MG/1; MG/1
1 TABLET ORAL EVERY 8 HOURS PRN
Qty: 75 TABLET | Refills: 0 | Status: SHIPPED | OUTPATIENT
Start: 2022-11-14 | End: 2022-12-14

## 2022-11-02 NOTE — TELEPHONE ENCOUNTER
MRI 2022 without tumor, stable from prior.    Now having brain symptoms  Okay for new MRI.  Otherwise f/u with PCP/neurology.

## 2022-11-29 ENCOUNTER — OFFICE VISIT (OUTPATIENT)
Dept: PAIN MEDICINE | Facility: CLINIC | Age: 62
End: 2022-11-29
Payer: OTHER GOVERNMENT

## 2022-11-29 DIAGNOSIS — M54.12 CERVICAL RADICULOPATHY: Primary | ICD-10-CM

## 2022-11-29 DIAGNOSIS — M47.816 LUMBAR SPONDYLOSIS: ICD-10-CM

## 2022-11-29 DIAGNOSIS — R26.81 UNSTEADY GAIT: ICD-10-CM

## 2022-11-29 DIAGNOSIS — R91.8 LUNG MASS: ICD-10-CM

## 2022-11-29 PROCEDURE — 99213 PR OFFICE/OUTPT VISIT, EST, LEVL III, 20-29 MIN: ICD-10-PCS | Mod: 95,,, | Performed by: ANESTHESIOLOGY

## 2022-11-29 PROCEDURE — 99213 OFFICE O/P EST LOW 20 MIN: CPT | Mod: 95,,, | Performed by: ANESTHESIOLOGY

## 2022-11-29 RX ORDER — OXYCODONE AND ACETAMINOPHEN 5; 325 MG/1; MG/1
1 TABLET ORAL EVERY 8 HOURS PRN
Qty: 75 TABLET | Refills: 0 | Status: SHIPPED | OUTPATIENT
Start: 2023-02-12 | End: 2023-03-02 | Stop reason: SDUPTHER

## 2022-11-29 RX ORDER — DICLOFENAC SODIUM 10 MG/G
GEL TOPICAL 3 TIMES DAILY PRN
Qty: 1 EACH | Refills: 5 | Status: SHIPPED | OUTPATIENT
Start: 2022-11-29

## 2022-11-29 RX ORDER — OXYCODONE AND ACETAMINOPHEN 5; 325 MG/1; MG/1
1 TABLET ORAL EVERY 8 HOURS PRN
Qty: 75 TABLET | Refills: 0 | Status: SHIPPED | OUTPATIENT
Start: 2023-01-13 | End: 2023-02-12

## 2022-11-29 RX ORDER — LIDOCAINE 50 MG/G
PATCH TOPICAL DAILY
Qty: 30 PATCH | Refills: 6 | Status: SHIPPED | OUTPATIENT
Start: 2022-11-29 | End: 2023-06-01 | Stop reason: SDUPTHER

## 2022-11-29 NOTE — PROGRESS NOTES
This note was completed with dictation software and grammatical errors may exist.    The patient location is: [place], home    Visit type: audiovisual    Face to Face time with patient: 10  21 minutes of total time spent on the encounter, which includes face to face time and non-face to face time preparing to see the patient (eg, review of tests), Obtaining and/or reviewing separately obtained history, Documenting clinical information in the electronic or other health record, Independently interpreting results (not separately reported) and communicating results to the patient/family/caregiver, or Care coordination (not separately reported).     Each patient to whom he or she provides medical services by telemedicine is:  (1) informed of the relationship between the physician and patient and the respective role of any other health care provider with respect to management of the patient; and (2) notified that he or she may decline to receive medical services by telemedicine and may withdraw from such care at any time.      CC:  Neck pain, bilateral hand pain, back pain, lower leg pain    HPI:  The patient is a 62-year-old man with a history of cryptogenic organizing pneumonia, cervical fusion, chronic migraine headaches, pituitary tumor who presents in self-referral for neck pain and back pain.  He returns in follow-up for virtual visit.  He returns in follow-up today for neck pain, back pain.  Since his last visit they had done another CT scan of his lungs and found that 1 of the masses has increased in size.  They still do not know specifically what the mass may be, they have placed him on a 5 week steroid dose, he has just finished the 2nd week.  Does report that he was doing well during the 1st week but now is having insomnia and actually having more back pain.  He continues to have problems with balance, dizziness in addition to his deconditioning that causes issues with balance.  He was never able to do  physical therapy when we talked to him last.  This may have been an issue with insurance referral.  He has an appointment with Neurology today.        Previous History:  The patient reports having a long history of neck pain, bilateral upper back pain, chronic migraine headaches particularly on the left side and low back pain with bilateral lower leg numbness and tingling.  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  He has had to change physicians regularly because of this.  He states that he was having severe neck pain and numbness and tingling down his left arm in 2016, ended up having a C6/7 ACDF but apparently there was a posterior approach as well.  He states that the symptoms did improve somewhat but in the last 2 years have become severely worse.  He reports that due to his surgery he did have some recurrent laryngeal nerve issue and has had difficulty with swallowing and speaking.  He states that the neck pain, upper back pain and bilateral shoulder pain has worsened in the last several years but states that he has not seen a physician about this.  He states that he has weakness in his arms, weakness in his legs and the pain is getting so severe that he feels that he cannot live life and has even considered suicide.  He had seen a pain management physician several years ago in [place] but they had recommended injections any decided not to do that.  For the last several years he has not seen anybody about this.    In terms of his low back pain is across bilateral low back, worse with standing and walking improved with sitting down.  He also has pain in the lower legs and feet with numbness, burning and aching.  The pain is worse with prolonged sitting worse with getting out of a bed or a chair.      He also has a history of chronic migraines that last anywhere from 8-24  hours.  He has recently started seeing a neurologist who is giving him Aimovig and this seems to be helping.    Pain intervention history:  No history of injections    Spine surgeries:  He had undergone anterior and posterior cervical surgeries in about 2016, no surgery on his lumbar spine    Antineuropathics:  Gabapentin and Lyrica caused imbalance issues  NSAIDs:  Motrin provide slight relief but causes swelling, Tylenol no benefit.  Lidocaine patch, Voltaren gel slight benefit.  Physical therapy:  Has not done any physical therapy recently.  Antidepressants:  Muscle relaxers:  Muscle relaxants cause imbalance  Opioids:  Percocet has helped in the past  Antiplatelets/Anticoagulants:        ROS:  He reports weight loss, weakness, depression, suicidal ideation.  Balance of review of systems is negative.    No results found for: LABA1C, HGBA1C    No results found for: WBC, HGB, HCT, MCV, PLT          Past Medical History:   Diagnosis Date    H/O: pituitary tumor     Thyroid disease     Ventricular tachycardia        Past Surgical History:   Procedure Laterality Date    history of cervical fusion      transphenoidal pituitary tumor resection         Social History     Socioeconomic History    Marital status:    Tobacco Use    Smoking status: Never    Smokeless tobacco: Never   Substance and Sexual Activity    Alcohol use: Never    Drug use: Never         Medications/Allergies: See med card    There were no vitals filed for this visit.      Physical exam:  Gen: A and O x3, pleasant, well-groomed        Imaging:  MRI lumbar spine outside institution, I reviewed this personally with the patient.  There is some mild facet arthropathy at L3/4, L4/5 and L5/S1.  Mild broad base bulging at L4/5 with no canal narrowing but mild to moderate foraminal narrowing left slightly greater than right.    MRI cervical spine 12/02/2020 outside institution:  I reviewed these images personally with the patient.  This shows previous  fusion at C6/7, facet arthropathy at multiple levels, does have canal narrowing due to posterior ligamentous hypertrophy and disc bulging at C3/4.  There is no cord signal change.  He reports having an EMG of the lower extremities, none of the upper extremities.    Assessment:   The patient is a 62-year-old man with a history of cryptogenic organizing pneumonia, cervical fusion, chronic migraine headaches, pituitary tumor who presents in self-referral for neck pain and back pain.    1. Cervical radiculopathy        2. Lumbar spondylosis        3. Lung mass        4. Unsteady gait                Plan:  1. I told him to find a new physical therapy place and I will send orders but he will need to talk to Neurology 1st find out if it is appropriate with his dizziness.    2. I told him to contact us if needing any other advice in the meantime, I have refilled his lidocaine patch and has topical diclofenac.    3.  I will have him follow up in 2-3 months or sooner as needed.  I have refilled his Percocet for the next 3 months.  He has not shown any signs of abuse or addiction.      The total time spent for evaluation and management today including reviewing separately obtained history, performing a medically appropriate exam and evaluation, documenting clinical information in the health record, independently interpreting results and communicating them to the patient/family/caregiver, and ordering medications/tests/procedures was between 20-29 minutes.

## 2022-12-07 ENCOUNTER — PATIENT MESSAGE (OUTPATIENT)
Dept: PAIN MEDICINE | Facility: CLINIC | Age: 62
End: 2022-12-07
Payer: OTHER GOVERNMENT

## 2022-12-09 NOTE — TELEPHONE ENCOUNTER
Sorry to hear Evan. [facility], I can always send elsewhere.  As far as the panic attacks, that's not really my area of expertise. [place] help you with that? I'm not sure what they would do if they felt the anxiety was coming from high dose steroid. Let me know if your PCP can't help you.  [address], [place] [address]

## 2023-01-10 ENCOUNTER — PATIENT MESSAGE (OUTPATIENT)
Dept: ENDOCRINOLOGY | Facility: CLINIC | Age: 63
End: 2023-01-10
Payer: OTHER GOVERNMENT

## 2023-01-12 ENCOUNTER — PATIENT MESSAGE (OUTPATIENT)
Dept: ENDOCRINOLOGY | Facility: CLINIC | Age: 63
End: 2023-01-12
Payer: OTHER GOVERNMENT

## 2023-01-12 DIAGNOSIS — E29.1 HYPOGONADISM IN MALE: Primary | ICD-10-CM

## 2023-01-13 RX ORDER — NEEDLES, DISPOSABLE 27GX1/2"
NEEDLE, DISPOSABLE MISCELLANEOUS
Qty: 100 EACH | Refills: 0 | Status: SHIPPED | OUTPATIENT
Start: 2023-01-13 | End: 2023-04-10 | Stop reason: SDUPTHER

## 2023-01-13 RX ORDER — SYRINGE, DISPOSABLE, 3 ML
SYRINGE, EMPTY DISPOSABLE MISCELLANEOUS
Qty: 100 EACH | Refills: 0 | Status: SHIPPED | OUTPATIENT
Start: 2023-01-13 | End: 2023-04-18 | Stop reason: SDUPTHER

## 2023-02-13 ENCOUNTER — PATIENT MESSAGE (OUTPATIENT)
Dept: ENDOCRINOLOGY | Facility: CLINIC | Age: 63
End: 2023-02-13
Payer: OTHER GOVERNMENT

## 2023-02-15 NOTE — PROGRESS NOTES
Subjective:      Chief Complaint: Hypothyroidism    The patient location is: [place]  The chief complaint leading to consultation is: pituitary    Visit type: audiovisual    Face to Face time with patient: 5 minutes  10 minutes of total time spent on the encounter, which includes face to face time and non-face to face time preparing to see the patient (eg, review of tests), Obtaining and/or reviewing separately obtained history, Documenting clinical information in the electronic or other health record, Independently interpreting results (not separately reported) and communicating results to the patient/family/caregiver, or Care coordination (not separately reported).     Each patient to whom he or she provides medical services by telemedicine is:  (1) informed of the relationship between the physician and patient and the respective role of any other health care provider with respect to management of the patient; and (2) notified that he or she may decline to receive medical services by telemedicine and may withdraw from such care at any time.    Notes:    HPI: Evan Olivo is a 62 y.o. male who is having a follow-up evaluation for pituitary, hypogonadism.   Last seen 8/16/2022. Had recommended growth hormone testing, not done. Also recommended labs, not done.    Pt noted hx pituitary tumor.  Surgery 2016. Recurrence, s/p radiation around 2018.     Last MRI 2022: No residual tumor, post-operative changes, stable findings from prior MRI.  Prior MRI 2020: No residual tumor. Post-op changes.     Has hypothyroidism:   Medication: 75 mcg/day levothyroxine   labs 8/2022 TSH 1.74, free t4 1.18     2/3/2022 TSH 2.8, free t4 1.2, normal     Hypogonadism:  Notes dx around 8 years ago.  On testosterone replacement with gel, then injections. Various doses.    Currently 200 mg q10 days.    Last level was 689 from 8/10/22  Last PSA normal, CBC okay    Last DXA 11/2020. Osteopenia.  Fractures: ankle. No trauma.    Reclast summer  2020, summer 2021 (around July)    Older labs:   WBC <2  Anemia, hemoglobin 10  Platelets normal  PSA 0.86  Cortisol 13.6 (lab from 7/27/2021 at 805am)   Testosterone 652  IGF 45 (low)    Had recommended GH testing, not done. Still working on getting that protocol approved here or closer to patient home      Today, pt reports feeling about the same.    Cold often at night. Most nights.  +constipation, chronic.  +fatigue.  Weight stable.    Occasional palpitations, stable    Low libido   +ED  Still headaches. Worse last month or two. Seeing neurology. Potentially weather related    Reviewed past medical, family, social history and updated as appropriate.    Review of Systems  As above    Objective:   Previous vitals:  BP Readings from Last 5 Encounters:   04/28/21 102/64   02/18/21 (!) 98/56 02/18/21 (!) 98/56 02/18/21 (!) 98/56     Physical Exam  Constitutional:       General: he is not in acute distress.  Pulmonary:     Effort: Pulmonary effort is normal.    Wt Readings from Last 10 Encounters:   05/06/21 1519 49 kg (108 lb)   04/28/21 0812 49 kg (108 lb 0.4 oz)   02/18/21 1348 49 kg (108 lb 0.4 oz)   02/18/21 0911 49.2 kg (108 lb 7.5 oz)   02/18/21 0749 49.2 kg (108 lb 9.2 oz)     No results found for: HGBA1C  No results found for: CHOL, HDL, LDLCALC, TRIG, CHOLHDL  No results found for: NA, K, CL, CO2, GLU, BUN, CREATININE, CALCIUM, PROT, ALBUMIN, BILITOT, ALKPHOS, AST, ALT, ANIONGAP, ESTGFRAFRICA, EGFRNONAA, TSH     Assessment/Plan:     Hypopituitarism  Hx pituitary tumor several years ago.   s/p surgery   then recurrence   now s/p brain radiation   - MRI 2022 without residual tumor      - did find potential abnormality on the right side of the clivus near ICA, stable from 2018. Recommended pt f/u with PCP, can consider CT neck for further investigation there   - evaluate/treat pituitary hormones per below      Low IGF-1 level  With hypopituitarism. 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   clinically,  still has fatigue. Treatment can help symptoms and also some benefit in bone density.   Recommended confirmation test before considering risks vs benefits of treatment:    Tried for [facility] testing. Pt reported plans to have testing done locally, encourage pt to send results if able.   Otherwise can consider older testing method. Difficult due to pt long travel time to reach the clinic, other health conditions.      Osteopenia of multiple sites  Last DXA 11/2020, osteopenia   - pt noted hx ankle fracture without trauma   - s/p reclast doses, a few times. Not recently   summer 2020, 2021   - had recommended repeat DXA in late 2022, not done. Try for DXA if pt interested   - ensure adequate calcium and vitamin D intake   monitor      Hypogonadism in male  Long hx hypogonadism, pt noted this was a problem before pituitary tumor was found. Don't have labs/records to be sure if the cause was from the tumor or something else   either way, he has been on supplementation for a while. Gel then shot. Various doses over the years, didn't feel as good at q14 day dosing   now on 200 mg q10 days   last level normal. Been a while.   recheck labs when able      Hypothyroidism  On 75 mcg/day   - last labs okay. Been a while, recheck when able   - continue 75 mcg/day levothyroxine for now   - continue to monitor 1-2 times a year with labs.       Follow up in about 1 year (around 2/16/2024) for lab review, further monitoring.      Daniel Quinn MD  Endocrinology

## 2023-02-16 ENCOUNTER — OFFICE VISIT (OUTPATIENT)
Dept: ENDOCRINOLOGY | Facility: CLINIC | Age: 63
End: 2023-02-16
Payer: OTHER GOVERNMENT

## 2023-02-16 DIAGNOSIS — E03.2 HYPOTHYROIDISM DUE TO MEDICATION: ICD-10-CM

## 2023-02-16 DIAGNOSIS — M85.89 OSTEOPENIA OF MULTIPLE SITES: ICD-10-CM

## 2023-02-16 DIAGNOSIS — E23.0 HYPOPITUITARISM: ICD-10-CM

## 2023-02-16 DIAGNOSIS — R79.89 LOW IGF-1 LEVEL: ICD-10-CM

## 2023-02-16 DIAGNOSIS — E29.1 HYPOGONADISM IN MALE: ICD-10-CM

## 2023-02-16 PROCEDURE — 99214 PR OFFICE/OUTPT VISIT, EST, LEVL IV, 30-39 MIN: ICD-10-PCS | Mod: 95,,, | Performed by: INTERNAL MEDICINE

## 2023-02-16 PROCEDURE — 99214 OFFICE O/P EST MOD 30 MIN: CPT | Mod: 95,,, | Performed by: INTERNAL MEDICINE

## 2023-02-16 NOTE — ASSESSMENT & PLAN NOTE
Hx pituitary tumor several years ago.   s/p surgery   then recurrence   now s/p brain radiation   - MRI 2022 without residual tumor      - did find potential abnormality on the right side of the clivus near ICA, stable from 2018. Recommended pt f/u with PCP, can consider CT neck for further investigation there   - evaluate/treat pituitary hormones per below

## 2023-02-16 NOTE — ASSESSMENT & PLAN NOTE
Long hx hypogonadism, pt noted this was a problem before pituitary tumor was found. Don't have labs/records to be sure if the cause was from the tumor or something else   either way, he has been on supplementation for a while. Gel then shot. Various doses over the years, didn't feel as good at q14 day dosing   now on 200 mg q10 days   last level normal. Been a while.   recheck labs when able

## 2023-02-16 NOTE — ASSESSMENT & PLAN NOTE
With hypopituitarism. 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   clinically, still has fatigue. Treatment can help symptoms and also some benefit in bone density.   Recommended confirmation test before considering risks vs benefits of treatment:    Tried for [facility] testing. Pt reported plans to have testing done locally, encourage pt to send results if able.   Otherwise can consider older testing method. Difficult due to pt long travel time to reach the clinic, other health conditions.

## 2023-02-16 NOTE — ASSESSMENT & PLAN NOTE
Last DXA 11/2020, osteopenia   - pt noted hx ankle fracture without trauma   - s/p reclast doses, a few times. Not recently   summer 2020, 2021   - had recommended repeat DXA in late 2022, not done. Try for DXA if pt interested   - ensure adequate calcium and vitamin D intake   monitor

## 2023-02-16 NOTE — ASSESSMENT & PLAN NOTE
On 75 mcg/day   - last labs okay. Been a while, recheck when able   - continue 75 mcg/day levothyroxine for now   - continue to monitor 1-2 times a year with labs.

## 2023-02-23 ENCOUNTER — PATIENT MESSAGE (OUTPATIENT)
Dept: PAIN MEDICINE | Facility: CLINIC | Age: 63
End: 2023-02-23
Payer: OTHER GOVERNMENT

## 2023-03-02 ENCOUNTER — OFFICE VISIT (OUTPATIENT)
Dept: PAIN MEDICINE | Facility: CLINIC | Age: 63
End: 2023-03-02
Payer: OTHER GOVERNMENT

## 2023-03-02 DIAGNOSIS — R26.89 IMBALANCE: ICD-10-CM

## 2023-03-02 DIAGNOSIS — G89.4 CHRONIC PAIN DISORDER: ICD-10-CM

## 2023-03-02 DIAGNOSIS — M54.12 CERVICAL RADICULOPATHY: Primary | ICD-10-CM

## 2023-03-02 DIAGNOSIS — R91.8 LUNG MASS: ICD-10-CM

## 2023-03-02 DIAGNOSIS — M47.816 LUMBAR SPONDYLOSIS: ICD-10-CM

## 2023-03-02 PROCEDURE — 99212 PR OFFICE/OUTPT VISIT, EST, LEVL II, 10-19 MIN: ICD-10-PCS | Mod: 95,,, | Performed by: ANESTHESIOLOGY

## 2023-03-02 PROCEDURE — 99212 OFFICE O/P EST SF 10 MIN: CPT | Mod: 95,,, | Performed by: ANESTHESIOLOGY

## 2023-03-02 RX ORDER — OXYCODONE AND ACETAMINOPHEN 5; 325 MG/1; MG/1
1 TABLET ORAL EVERY 8 HOURS PRN
Qty: 75 TABLET | Refills: 0 | Status: SHIPPED | OUTPATIENT
Start: 2023-05-13 | End: 2023-06-01 | Stop reason: SDUPTHER

## 2023-03-02 RX ORDER — OXYCODONE AND ACETAMINOPHEN 5; 325 MG/1; MG/1
1 TABLET ORAL EVERY 8 HOURS PRN
Qty: 75 TABLET | Refills: 0 | Status: SHIPPED | OUTPATIENT
Start: 2023-04-13 | End: 2023-05-13

## 2023-03-02 RX ORDER — OXYCODONE AND ACETAMINOPHEN 5; 325 MG/1; MG/1
1 TABLET ORAL EVERY 8 HOURS PRN
Qty: 75 TABLET | Refills: 0 | Status: SHIPPED | OUTPATIENT
Start: 2023-03-14 | End: 2023-04-13

## 2023-03-02 NOTE — PROGRESS NOTES
This note was completed with dictation software and grammatical errors may exist.    The patient location is: [place], home    Visit type: audiovisual    Face to Face time with patient: 8 minutes  14minutes of total time spent on the encounter, which includes face to face time and non-face to face time preparing to see the patient (eg, review of tests), Obtaining and/or reviewing separately obtained history, Documenting clinical information in the electronic or other health record, Independently interpreting results (not separately reported) and communicating results to the patient/family/caregiver, or Care coordination (not separately reported).     Each patient to whom he or she provides medical services by telemedicine is:  (1) informed of the relationship between the physician and patient and the respective role of any other health care provider with respect to management of the patient; and (2) notified that he or she may decline to receive medical services by telemedicine and may withdraw from such care at any time.      CC:  Neck pain, bilateral hand pain, back pain, lower leg pain    HPI:  The patient is a 62-year-old man with a history of cryptogenic organizing pneumonia, cervical fusion, chronic migraine headaches, pituitary tumor who presents in self-referral for neck pain and back pain.  He returns in follow-up today for neck and back pain, states that his pain has been fairly stable other than he has been trying to walk more and was doing this outside instead of on a treadmill and feels that his back and hips have hurt worse lately.  He has now switched back to treadmill where he can hold onto the bars and this bothers his back less.  Otherwise he denies any major changes.  He is breathing is stable, he went to physical therapy for a number of visits and is doing the exercises on his own and feels that his balance and dizziness has stabilized somewhat.  He continues to take pain medication with  moderate relief in addition to some topical over-the-counter medications.          Previous History:  The patient reports having a long history of neck pain, bilateral upper back pain, chronic migraine headaches particularly on the left side and low back pain with bilateral lower leg numbness and tingling.  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  He has had to change physicians regularly because of this.  He states that he was having severe neck pain and numbness and tingling down his left arm in 2016, ended up having a C6/7 ACDF but apparently there was a posterior approach as well.  He states that the symptoms did improve somewhat but in the last 2 years have become severely worse.  He reports that due to his surgery he did have some recurrent laryngeal nerve issue and has had difficulty with swallowing and speaking.  He states that the neck pain, upper back pain and bilateral shoulder pain has worsened in the last several years but states that he has not seen a physician about this.  He states that he has weakness in his arms, weakness in his legs and the pain is getting so severe that he feels that he cannot live life and has even considered suicide.  He had seen a pain management physician several years ago in [place] but they had recommended injections any decided not to do that.  For the last several years he has not seen anybody about this.    In terms of his low back pain is across bilateral low back, worse with standing and walking improved with sitting down.  He also has pain in the lower legs and feet with numbness, burning and aching.  The pain is worse with prolonged sitting worse with getting out of a bed or a chair.      He also has a history of chronic migraines that last anywhere from 8-24 hours.  He has recently started seeing a neurologist who is giving  him Aimovig and this seems to be helping.    Pain intervention history:  No history of injections    Spine surgeries:  He had undergone anterior and posterior cervical surgeries in about 2016, no surgery on his lumbar spine    Antineuropathics:  Gabapentin and Lyrica caused imbalance issues  NSAIDs:  Motrin provide slight relief but causes swelling, Tylenol no benefit.  Lidocaine patch, Voltaren gel slight benefit.  Physical therapy:  Has not done any physical therapy recently.  Antidepressants:  Muscle relaxers:  Muscle relaxants cause imbalance  Opioids:  Percocet has helped in the past  Antiplatelets/Anticoagulants:        ROS:  He reports weight loss, weakness, depression, suicidal ideation.  Balance of review of systems is negative.    No results found for: LABA1C, HGBA1C    No results found for: WBC, HGB, HCT, MCV, PLT          Past Medical History:   Diagnosis Date    H/O: pituitary tumor     Thyroid disease     Ventricular tachycardia        Past Surgical History:   Procedure Laterality Date    history of cervical fusion      transphenoidal pituitary tumor resection         Social History     Socioeconomic History    Marital status:    Tobacco Use    Smoking status: Never    Smokeless tobacco: Never   Substance and Sexual Activity    Alcohol use: Never    Drug use: Never         Medications/Allergies: See med card    There were no vitals filed for this visit.      Physical exam:  Gen: A and O x3, pleasant, well-groomed        Imaging:  MRI lumbar spine outside institution, I reviewed this personally with the patient.  There is some mild facet arthropathy at L3/4, L4/5 and L5/S1.  Mild broad base bulging at L4/5 with no canal narrowing but mild to moderate foraminal narrowing left slightly greater than right.    MRI cervical spine 12/02/2020 outside institution:  I reviewed these images personally with the patient.  This shows previous fusion at C6/7, facet arthropathy at multiple levels, does have  canal narrowing due to posterior ligamentous hypertrophy and disc bulging at C3/4.  There is no cord signal change.  He reports having an EMG of the lower extremities, none of the upper extremities.    Assessment:   The patient is a 62-year-old man with a history of cryptogenic organizing pneumonia, cervical fusion, chronic migraine headaches, pituitary tumor who presents in self-referral for neck pain and back pain.    1. Cervical radiculopathy        2. Lumbar spondylosis        3. Lung mass        4. Chronic pain disorder        5. Imbalance                  Plan:  1. He is currently stable, I will send his pain medications in for the next 3 months, I will have him follow up in 3 months or sooner as needed.  2. He states that he may see pulmonary care in [place] where his son lives, otherwise his breathing is stable for now        The total time spent for evaluation and management today including reviewing separately obtained history, performing a medically appropriate exam and evaluation, documenting clinical information in the health record, independently interpreting results and communicating them to the patient/family/caregiver, and ordering medications/tests/procedures was between 10-19 minutes.

## 2023-03-07 NOTE — TELEPHONE ENCOUNTER
Munira Arboleda, PharmD     3/14/22 8:50 AM  Note  Incoming call from provider's office about status of macimorelin. Call from Marian Mckeon at [facility]     Notified her that it has not been work-up or assigned yet but will send a message to the ID team     If rx needs a PA, she would like a status update sent via epic if PA is approved or denied     Routing ID team           Cheek Interpolation Flap Text: A decision was made to reconstruct the defect utilizing an interpolation axial flap and a staged reconstruction.  A telfa template was made of the defect.  This telfa template was then used to outline the Cheek Interpolation flap.  The donor area for the pedicle flap was then injected with anesthesia.  The flap was excised through the skin and subcutaneous tissue down to the layer of the underlying musculature.  The interpolation flap was carefully excised within this deep plane to maintain its blood supply.  The edges of the donor site were undermined.   The donor site was closed in a primary fashion.  The pedicle was then rotated into position and sutured.  Once the tube was sutured into place, adequate blood supply was confirmed with blanching and refill.  The pedicle was then wrapped with xeroform gauze and dressed appropriately with a telfa and gauze bandage to ensure continued blood supply and protect the attached pedicle.

## 2023-03-14 ENCOUNTER — TELEPHONE (OUTPATIENT)
Dept: ENDOCRINOLOGY | Facility: CLINIC | Age: 63
End: 2023-03-14
Payer: OTHER GOVERNMENT

## 2023-03-14 ENCOUNTER — PATIENT MESSAGE (OUTPATIENT)
Dept: ENDOCRINOLOGY | Facility: CLINIC | Age: 63
End: 2023-03-14
Payer: OTHER GOVERNMENT

## 2023-03-14 NOTE — TELEPHONE ENCOUNTER
Got labs. 4pm from 3/7/2023    WBC 2.5, low  HGB 11.9, low      Na 135  Osm, calc, 272 (low)    Glucose 121  Ca 9.1  GFR 68  PSA 1    ALT 59 (normal up to 61)  AST 75 (normal up to 37 on that lab)    Bili, total 1.4 (elevated)    TSH 1.56, free t4 1.1    Testosterone 310    IGF 34, low (normal )        Thyroid normal, okay to continue same regimen  Testosterone normal, same regimen    F/u with PCP for anemia/low WBC.

## 2023-04-07 ENCOUNTER — PATIENT MESSAGE (OUTPATIENT)
Dept: ENDOCRINOLOGY | Facility: CLINIC | Age: 63
End: 2023-04-07
Payer: OTHER GOVERNMENT

## 2023-04-10 DIAGNOSIS — E29.1 HYPOGONADISM MALE: ICD-10-CM

## 2023-04-10 DIAGNOSIS — E29.1 HYPOGONADISM IN MALE: ICD-10-CM

## 2023-04-10 RX ORDER — NEEDLES, DISPOSABLE 27GX1/2"
NEEDLE, DISPOSABLE MISCELLANEOUS
Qty: 100 EACH | Refills: 0 | Status: SHIPPED | OUTPATIENT
Start: 2023-04-10 | End: 2023-04-18 | Stop reason: SDUPTHER

## 2023-04-10 RX ORDER — TESTOSTERONE CYPIONATE 200 MG/ML
200 INJECTION, SOLUTION INTRAMUSCULAR
Qty: 3 ML | Refills: 2 | Status: SHIPPED | OUTPATIENT
Start: 2023-04-10 | End: 2023-04-18 | Stop reason: SDUPTHER

## 2023-04-10 NOTE — LETTER
April 11, 2023       [facility]  [address] [place] [address]  Phone: [phone]   Patient: Evan Olivo   MR Number: [id number]   YOB: 1960   Date of Visit: 4/10/2023     Dear  Pre-authorization Department,     I have ordered a Growth Hormone Stimulation Test (CPT Code 51753) which consist of the following processes:    1.The appt. will be scheduled as a Nurse Visit to be conducted [facility].  2.An IV catheter will be inserted to draw blood from.  3. Weigh patient.  4. Reconsitute/mix Macrilen. Dose: 0.5 mg/kg  5. The patient must drink the entire volume of Macrilen solution within 30 seconds. Observe the patient for the duration of the test.  6. Draw labs: Growth Hormone Level X 4  No baseline blood draw is required with Macrilen. After administering Macrilen, draw venous blood samples at 30, 45, 60, and 90 minutes.   7.After that, pt is done, can go home and we will contact with results.    We are seeking authorization from [facility] to perform this test. Feel free to contact me at [phone] for any questions or concerns.     Sincerely,        Daniel Quinn MD

## 2023-04-10 NOTE — LETTER
April 11, 2023        [facility] Pre-authorization Department             [facility]  [address] [facility] [place]  [place] [address]  Phone: [phone]   Patient: Evan Olivo   MR Number: [id number]   YOB: 1960   Date of Visit: 4/10/2023     Dear Dr. Colbert Pre-authorization Department,     I have ordered a Growth Hormone Stimulation Test (CPT Code 67055) which consist of the following processes:    1.The appt. will be scheduled as a Nurse Visit to be conducted in [facility].  2.An IV catheter will be inserted to draw blood from.  3. Weigh patient.  4. Reconsitute/mix Macrilen. Dose: 0.5 mg/kg  5. The patient must drink the entire volume of Macrilen solution within 30 seconds. Observe the patient for the duration of the test.  6. Draw labs: Growth Hormone Level X 4  No baseline blood draw is required with Macrilen. After administering Macrilen, draw venous blood samples at 30, 45, 60, and 90 minutes.   7.After that, pt is done, can go home and we will contact with results.    Sincerely,        Daniel Quinn MD

## 2023-04-11 NOTE — TELEPHONE ENCOUNTER
Anamaria at [facility] Referral Management called requesting that we fax the details of what is required to perform the Growth Hormone Stimulation Test & fax it to the Pre-Authorization Dept. @ [phone] so we can have a written authorization. The following will be placed on [facility] letterhead  will be located in the Letter Tab in Epic:    Dr. Daniel Quinn will be performing a Growth Hormone Stimulation Test (CPT Code 71486) which will consist of the following processes:    1.The appt. will be scheduled as a Nurse Visit to be conducted in [facility].  2.An IV catheter will be inserted to draw blood from.  3. Weigh patient.  4. Reconsitute/mix Macrilen. Dose: 0.5 mg/kg  5. The patient must drink the entire volume of Macrilen solution within 30 seconds. Observe the patient for the duration of the test.  6. Draw labs: Growth Hormone Level  No baseline blood draw is required with Macrilen. After administering Macrilen, draw venous blood samples at 30, 45, 60, and 90 minutes.   7..After that, pt is done, can go home and we will contact with results.    The Nurse will remain in the room with the patient at all times.    A draft of this letter has been placed in you mailbox for review/revisions & signature. Also, they need every lab order to be sent with this letter.

## 2023-04-12 ENCOUNTER — PATIENT MESSAGE (OUTPATIENT)
Dept: ENDOCRINOLOGY | Facility: CLINIC | Age: 63
End: 2023-04-12
Payer: OTHER GOVERNMENT

## 2023-04-12 DIAGNOSIS — E29.1 HYPOGONADISM MALE: ICD-10-CM

## 2023-04-12 DIAGNOSIS — E29.1 HYPOGONADISM IN MALE: ICD-10-CM

## 2023-04-13 DIAGNOSIS — E29.1 HYPOGONADISM MALE: ICD-10-CM

## 2023-04-14 ENCOUNTER — PATIENT MESSAGE (OUTPATIENT)
Dept: ENDOCRINOLOGY | Facility: CLINIC | Age: 63
End: 2023-04-14
Payer: OTHER GOVERNMENT

## 2023-04-18 DIAGNOSIS — R79.89 LOW IGF-1 LEVEL: Primary | ICD-10-CM

## 2023-04-18 RX ORDER — SYRINGE, DISPOSABLE, 3 ML
SYRINGE, EMPTY DISPOSABLE MISCELLANEOUS
Qty: 100 EACH | Refills: 0 | Status: SHIPPED | OUTPATIENT
Start: 2023-04-18 | End: 2023-05-29 | Stop reason: SDUPTHER

## 2023-04-18 RX ORDER — TESTOSTERONE CYPIONATE 200 MG/ML
200 INJECTION, SOLUTION INTRAMUSCULAR
Qty: 3 ML | Refills: 2 | Status: SHIPPED | OUTPATIENT
Start: 2023-04-18 | End: 2023-05-29 | Stop reason: SDUPTHER

## 2023-04-18 RX ORDER — NEEDLES, DISPOSABLE 27GX1/2"
NEEDLE, DISPOSABLE MISCELLANEOUS
Qty: 100 EACH | Refills: 0 | Status: SHIPPED | OUTPATIENT
Start: 2023-04-18 | End: 2023-05-29 | Stop reason: SDUPTHER

## 2023-04-20 ENCOUNTER — PATIENT MESSAGE (OUTPATIENT)
Dept: ENDOCRINOLOGY | Facility: CLINIC | Age: 63
End: 2023-04-20
Payer: OTHER GOVERNMENT

## 2023-04-20 ENCOUNTER — TELEPHONE (OUTPATIENT)
Dept: ENDOCRINOLOGY | Facility: CLINIC | Age: 63
End: 2023-04-20
Payer: OTHER GOVERNMENT

## 2023-04-20 DIAGNOSIS — R79.89 LOW IGF-1 LEVEL: Primary | ICD-10-CM

## 2023-04-20 NOTE — TELEPHONE ENCOUNTER
Dr. Quinn has ordered an Growth Hormone Stimulation Test. Per our conversation I have scheduled you an appointment on April 25th @ 08:00am in the [facility].    1) This test is done in the morning, and is fasting.  If necessary, water is not restricted.  2) The test is a timed blood draw over 1.5 hours.  Expect to be in the clinic approximately 120 minutes. You will have no restrictions after the test and may resume your regular activities, including returning to work.    Do not hesitate to contact us with any additional questions.    Sincerely,  Alva Alvarado LPN

## 2023-05-03 ENCOUNTER — TELEPHONE (OUTPATIENT)
Dept: ENDOCRINOLOGY | Facility: CLINIC | Age: 63
End: 2023-05-03
Payer: OTHER GOVERNMENT

## 2023-05-04 ENCOUNTER — PATIENT MESSAGE (OUTPATIENT)
Dept: ENDOCRINOLOGY | Facility: CLINIC | Age: 63
End: 2023-05-04

## 2023-05-04 ENCOUNTER — CLINICAL SUPPORT (OUTPATIENT)
Dept: ENDOCRINOLOGY | Facility: CLINIC | Age: 63
End: 2023-05-04
Payer: OTHER GOVERNMENT

## 2023-05-04 VITALS — WEIGHT: 102.19 LBS | BODY MASS INDEX: 14.66 KG/M2

## 2023-05-04 PROCEDURE — 99212 OFFICE O/P EST SF 10 MIN: CPT | Mod: PBBFAC,PO

## 2023-05-04 PROCEDURE — 99999 PR PBB SHADOW E&M-EST. PATIENT-LVL II: CPT | Mod: PBBFAC,,,

## 2023-05-04 PROCEDURE — 99999 PR PBB SHADOW E&M-EST. PATIENT-LVL II: ICD-10-PCS | Mod: PBBFAC,,,

## 2023-05-04 NOTE — TELEPHONE ENCOUNTER
Good afternoon!  Mr. Olivo would like to discuss his visit today concerning the Growth Hormone Stimulation Test. The visit notes have been routed to you for review.

## 2023-05-04 NOTE — PROGRESS NOTES
Presented for Growth Hormone Stimulation Test with two patient identifiers verified. Pt verified fasting since 12 AM previous night before. Procedure explained in detail by EMIGDIO Mckeon RN and all questions answered. 22G PIV inserted by ESTELA Parrish RN in the right forearm at 8:50 AM. Began Growth Hormone Stimulation Test at 8:59 AM which consisted of 60 mg of Macrilen given in 120 ml of water as ordered by Dr. JAN Quinn.  On 30 minute lab draw attempt ,IV was noted to be infiltrated, therefore it was discontinued with catheter intact and pressure bandage applied. Offered for patient to go to lab for the lab draws but patient refused.   Patient awake, alert and oriented.  Patient discharged in stable condition @ 9:36 AM with no s/s of a reaction noted.

## 2023-05-24 ENCOUNTER — PATIENT MESSAGE (OUTPATIENT)
Dept: ENDOCRINOLOGY | Facility: CLINIC | Age: 63
End: 2023-05-24
Payer: OTHER GOVERNMENT

## 2023-05-24 ENCOUNTER — PATIENT MESSAGE (OUTPATIENT)
Dept: PAIN MEDICINE | Facility: CLINIC | Age: 63
End: 2023-05-24
Payer: OTHER GOVERNMENT

## 2023-05-24 DIAGNOSIS — E16.2 HYPOGLYCEMIA: ICD-10-CM

## 2023-05-24 DIAGNOSIS — E23.0 HYPOPITUITARISM: ICD-10-CM

## 2023-05-24 DIAGNOSIS — E03.9 HYPOTHYROIDISM, UNSPECIFIED TYPE: ICD-10-CM

## 2023-05-24 DIAGNOSIS — E29.1 HYPOGONADISM MALE: ICD-10-CM

## 2023-05-24 DIAGNOSIS — E29.1 HYPOGONADISM IN MALE: ICD-10-CM

## 2023-05-24 NOTE — TELEPHONE ENCOUNTER
Unfortunately, a virtual visit can only be done in the state where the physician has a medical license. So, this would be in [place] or [place]. So, if traveling to those places, it can be done.

## 2023-05-29 RX ORDER — TESTOSTERONE CYPIONATE 200 MG/ML
200 INJECTION, SOLUTION INTRAMUSCULAR
Qty: 3 ML | Refills: 4 | Status: SHIPPED | OUTPATIENT
Start: 2023-05-29 | End: 2023-06-05 | Stop reason: SDUPTHER

## 2023-05-29 RX ORDER — NEEDLES, DISPOSABLE 27GX1/2"
NEEDLE, DISPOSABLE MISCELLANEOUS
Qty: 100 EACH | Refills: 0 | Status: SHIPPED | OUTPATIENT
Start: 2023-05-29 | End: 2023-06-05 | Stop reason: SDUPTHER

## 2023-05-29 RX ORDER — SYRINGE, DISPOSABLE, 3 ML
SYRINGE, EMPTY DISPOSABLE MISCELLANEOUS
Qty: 100 EACH | Refills: 0 | Status: SHIPPED | OUTPATIENT
Start: 2023-05-29 | End: 2023-06-05 | Stop reason: SDUPTHER

## 2023-05-29 RX ORDER — LANCETS
EACH MISCELLANEOUS
Qty: 100 EACH | Refills: 11 | Status: SHIPPED | OUTPATIENT
Start: 2023-05-29 | End: 2023-06-05 | Stop reason: SDUPTHER

## 2023-05-29 RX ORDER — SILDENAFIL 100 MG/1
100 TABLET, FILM COATED ORAL DAILY PRN
Qty: 30 TABLET | Refills: 5 | Status: SHIPPED | OUTPATIENT
Start: 2023-05-29 | End: 2023-06-05 | Stop reason: SDUPTHER

## 2023-05-29 RX ORDER — LEVOTHYROXINE SODIUM 75 UG/1
75 TABLET ORAL
Qty: 90 TABLET | Refills: 3 | Status: SHIPPED | OUTPATIENT
Start: 2023-05-29 | End: 2023-06-05 | Stop reason: SDUPTHER

## 2023-05-29 NOTE — TELEPHONE ENCOUNTER
Last seen 2/16/2023    Had labs 3/2023, normal mostly    TSH, free t4, testosterone, PSA normal. Can continue same regimen for thyroid and testosterone.     Hemoglobin and white blood cell counts were low, some of the liver tests (AST and bilirubin) were mildly elevated, recommend keeping up with primary care to monitor those things.     IGF still low. Encourage pt to reach out to the office if interested in having the glucagon stimulation test done in the office sometime to evaluate for growth hormone deficiency.     Called pt. Discussed concerns about testing.  Refills sent in.  Recommend f/u later this year with main campus pituitary clinic.  Pt expressed understanding

## 2023-06-01 ENCOUNTER — OFFICE VISIT (OUTPATIENT)
Dept: PAIN MEDICINE | Facility: CLINIC | Age: 63
End: 2023-06-01
Payer: OTHER GOVERNMENT

## 2023-06-01 DIAGNOSIS — R53.83 FATIGUE, UNSPECIFIED TYPE: ICD-10-CM

## 2023-06-01 DIAGNOSIS — M54.12 CERVICAL RADICULOPATHY: Primary | ICD-10-CM

## 2023-06-01 DIAGNOSIS — G89.4 CHRONIC PAIN DISORDER: ICD-10-CM

## 2023-06-01 DIAGNOSIS — M47.816 LUMBAR SPONDYLOSIS: ICD-10-CM

## 2023-06-01 DIAGNOSIS — R26.89 IMBALANCE: ICD-10-CM

## 2023-06-01 PROCEDURE — 99212 PR OFFICE/OUTPT VISIT, EST, LEVL II, 10-19 MIN: ICD-10-PCS | Mod: 95,,, | Performed by: ANESTHESIOLOGY

## 2023-06-01 PROCEDURE — 99212 OFFICE O/P EST SF 10 MIN: CPT | Mod: 95,,, | Performed by: ANESTHESIOLOGY

## 2023-06-01 RX ORDER — LIDOCAINE 50 MG/G
PATCH TOPICAL DAILY
Qty: 30 PATCH | Refills: 6 | Status: SHIPPED | OUTPATIENT
Start: 2023-06-01 | End: 2024-02-22 | Stop reason: SDUPTHER

## 2023-06-01 RX ORDER — DICLOFENAC SODIUM 10 MG/G
2 GEL TOPICAL 3 TIMES DAILY PRN
Qty: 50 G | Refills: 6 | Status: SHIPPED | OUTPATIENT
Start: 2023-06-01 | End: 2024-02-22 | Stop reason: SDUPTHER

## 2023-06-01 RX ORDER — OXYCODONE AND ACETAMINOPHEN 5; 325 MG/1; MG/1
1 TABLET ORAL EVERY 8 HOURS PRN
Qty: 75 TABLET | Refills: 0 | Status: SHIPPED | OUTPATIENT
Start: 2023-06-12 | End: 2023-07-12

## 2023-06-01 RX ORDER — OXYCODONE AND ACETAMINOPHEN 5; 325 MG/1; MG/1
1 TABLET ORAL EVERY 8 HOURS PRN
Qty: 75 TABLET | Refills: 0 | Status: SHIPPED | OUTPATIENT
Start: 2023-07-12 | End: 2023-08-11

## 2023-06-01 RX ORDER — OXYCODONE AND ACETAMINOPHEN 5; 325 MG/1; MG/1
1 TABLET ORAL EVERY 8 HOURS PRN
Qty: 75 TABLET | Refills: 0 | Status: SHIPPED | OUTPATIENT
Start: 2023-08-11 | End: 2023-09-18 | Stop reason: SDUPTHER

## 2023-06-01 NOTE — PROGRESS NOTES
This note was completed with dictation software and grammatical errors may exist.    The patient location is: [place], home    Visit type: audiovisual    Face to Face time with patient: 10 minutes  16minutes of total time spent on the encounter, which includes face to face time and non-face to face time preparing to see the patient (eg, review of tests), Obtaining and/or reviewing separately obtained history, Documenting clinical information in the electronic or other health record, Independently interpreting results (not separately reported) and communicating results to the patient/family/caregiver, or Care coordination (not separately reported).     Each patient to whom he or she provides medical services by telemedicine is:  (1) informed of the relationship between the physician and patient and the respective role of any other health care provider with respect to management of the patient; and (2) notified that he or she may decline to receive medical services by telemedicine and may withdraw from such care at any time.      CC:  Neck pain, bilateral hand pain, back pain, lower leg pain    HPI:  The patient is a 62-year-old man with a history of cryptogenic organizing pneumonia, cervical fusion, chronic migraine headaches, pituitary tumor who presents in self-referral for neck pain and back pain.  The patient returns in follow-up today for multiple pains including neck and back pain.  Since the last visit he has had no major health changes.  He is still having balance and dizziness issues, seems to be worse in the afternoon.  He also states that his pain is worse in the afternoon.  As far as activity he has been trying to walk in the morning 15-30 minutes, uses a cane.  In the afternoon he walks but generally does this on a treadmill because his balance is worse in the afternoon.  He has been having palpitations, worsened pain and shortness of breath in the afternoons, uses oxygen in afternoon in the evening.   Otherwise he continues to take his pain medication, including Percocet and lidocaine, diclofenac gel and this seems to be helping his symptoms, improve his function.    Previous History:  The patient reports having a long history of neck pain, bilateral upper back pain, chronic migraine headaches particularly on the left side and low back pain with bilateral lower leg numbness and tingling.  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  He has had to change physicians regularly because of this.  He states that he was having severe neck pain and numbness and tingling down his left arm in 2016, ended up having a C6/7 ACDF but apparently there was a posterior approach as well.  He states that the symptoms did improve somewhat but in the last 2 years have become severely worse.  He reports that due to his surgery he did have some recurrent laryngeal nerve issue and has had difficulty with swallowing and speaking.  He states that the neck pain, upper back pain and bilateral shoulder pain has worsened in the last several years but states that he has not seen a physician about this.  He states that he has weakness in his arms, weakness in his legs and the pain is getting so severe that he feels that he cannot live life and has even considered suicide.  He had seen a pain management physician several years ago in [place] but they had recommended injections any decided not to do that.  For the last several years he has not seen anybody about this.    In terms of his low back pain is across bilateral low back, worse with standing and walking improved with sitting down.  He also has pain in the lower legs and feet with numbness, burning and aching.  The pain is worse with prolonged sitting worse with getting out of a bed or a chair.      He also has a history of chronic migraines that last  anywhere from 8-24 hours.  He has recently started seeing a neurologist who is giving him Aimovig and this seems to be helping.    Pain intervention history:  No history of injections    Spine surgeries:  He had undergone anterior and posterior cervical surgeries in about 2016, no surgery on his lumbar spine    Antineuropathics:  Gabapentin and Lyrica caused imbalance issues  NSAIDs:  Motrin provide slight relief but causes swelling, Tylenol no benefit.  Lidocaine patch, Voltaren gel slight benefit.  Physical therapy:  Has not done any physical therapy recently.  Antidepressants:  Muscle relaxers:  Muscle relaxants cause imbalance  Opioids:  Percocet has helped in the past  Antiplatelets/Anticoagulants:        ROS:  He reports weight loss, weakness, depression, suicidal ideation.  Balance of review of systems is negative.    No results found for: LABA1C, HGBA1C    No results found for: WBC, HGB, HCT, MCV, PLT          Past Medical History:   Diagnosis Date    H/O: pituitary tumor     Thyroid disease     Ventricular tachycardia        Past Surgical History:   Procedure Laterality Date    history of cervical fusion      transphenoidal pituitary tumor resection         Social History     Socioeconomic History    Marital status:    Tobacco Use    Smoking status: Never    Smokeless tobacco: Never   Substance and Sexual Activity    Alcohol use: Never    Drug use: Never         Medications/Allergies: See med card    There were no vitals filed for this visit.      Physical exam:  Gen: A and O x3, pleasant, well-groomed        Imaging:  MRI lumbar spine outside institution, I reviewed this personally with the patient.  There is some mild facet arthropathy at L3/4, L4/5 and L5/S1.  Mild broad base bulging at L4/5 with no canal narrowing but mild to moderate foraminal narrowing left slightly greater than right.    MRI cervical spine 12/02/2020 outside institution:  I reviewed these images personally with the patient.   This shows previous fusion at C6/7, facet arthropathy at multiple levels, does have canal narrowing due to posterior ligamentous hypertrophy and disc bulging at C3/4.  There is no cord signal change.  He reports having an EMG of the lower extremities, none of the upper extremities.    Assessment:   The patient is a 62-year-old man with a history of cryptogenic organizing pneumonia, cervical fusion, chronic migraine headaches, pituitary tumor who presents in self-referral for neck pain and back pain.    1. Cervical radiculopathy        2. Lumbar spondylosis        3. Chronic pain disorder        4. Fatigue, unspecified type        5. Imbalance                    Plan:  1. He is currently stable, I will send his pain medications in for the next 3 months, I will have him follow up in 3 months or sooner as needed.  2. It has been awhile since he has had an in-person visit and I explained that he needs to do this at least once a year and so his next visit will be in person.        The total time spent for evaluation and management today including reviewing separately obtained history, performing a medically appropriate exam and evaluation, documenting clinical information in the health record, independently interpreting results and communicating them to the patient/family/caregiver, and ordering medications/tests/procedures was between 10-19 minutes.

## 2023-06-03 ENCOUNTER — PATIENT MESSAGE (OUTPATIENT)
Dept: ENDOCRINOLOGY | Facility: CLINIC | Age: 63
End: 2023-06-03
Payer: OTHER GOVERNMENT

## 2023-06-05 DIAGNOSIS — E03.9 HYPOTHYROIDISM, UNSPECIFIED TYPE: ICD-10-CM

## 2023-06-05 DIAGNOSIS — E29.1 HYPOGONADISM MALE: ICD-10-CM

## 2023-06-05 DIAGNOSIS — E29.1 HYPOGONADISM IN MALE: ICD-10-CM

## 2023-06-05 DIAGNOSIS — E16.2 HYPOGLYCEMIA: ICD-10-CM

## 2023-06-05 DIAGNOSIS — E23.0 HYPOPITUITARISM: ICD-10-CM

## 2023-06-06 RX ORDER — NEEDLES, DISPOSABLE 27GX1/2"
NEEDLE, DISPOSABLE MISCELLANEOUS
Qty: 100 EACH | Refills: 0 | Status: SHIPPED | OUTPATIENT
Start: 2023-06-06

## 2023-06-06 RX ORDER — SYRINGE, DISPOSABLE, 3 ML
SYRINGE, EMPTY DISPOSABLE MISCELLANEOUS
Qty: 100 EACH | Refills: 0 | Status: SHIPPED | OUTPATIENT
Start: 2023-06-06

## 2023-06-06 RX ORDER — SILDENAFIL 100 MG/1
100 TABLET, FILM COATED ORAL DAILY PRN
Qty: 30 TABLET | Refills: 5 | Status: SHIPPED | OUTPATIENT
Start: 2023-06-06 | End: 2024-06-05

## 2023-06-06 RX ORDER — LEVOTHYROXINE SODIUM 75 UG/1
75 TABLET ORAL
Qty: 90 TABLET | Refills: 3 | Status: SHIPPED | OUTPATIENT
Start: 2023-06-06

## 2023-06-06 RX ORDER — LANCETS
EACH MISCELLANEOUS
Qty: 100 EACH | Refills: 11 | Status: SHIPPED | OUTPATIENT
Start: 2023-06-06

## 2023-06-06 RX ORDER — TESTOSTERONE CYPIONATE 200 MG/ML
200 INJECTION, SOLUTION INTRAMUSCULAR
Qty: 3 ML | Refills: 4 | Status: SHIPPED | OUTPATIENT
Start: 2023-06-06

## 2023-07-25 ENCOUNTER — TELEPHONE (OUTPATIENT)
Dept: PAIN MEDICINE | Facility: CLINIC | Age: 63
End: 2023-07-25
Payer: OTHER GOVERNMENT

## 2023-07-25 NOTE — TELEPHONE ENCOUNTER
----- Message from Jhony Stone sent at 7/25/2023  2:57 PM CDT -----  Type:  Patient Returning Call    Who Called:pt  Who Left Message for Patient:  Does the patient know what this is regarding?: advice  Would the patient rather a call back or a response via [facility]? Call  Best Call Back Number:[phone]  Additional Information: pt states he is having severe pain in sacrum area after a fall and would like to see if provider would consider an injection with steroids and numbing medicine.

## 2023-07-25 NOTE — TELEPHONE ENCOUNTER
Pt is requesting a TPI and wants to come in office hes coming far and would like to know before he comes the distance. Please advise

## 2023-07-27 ENCOUNTER — TELEPHONE (OUTPATIENT)
Dept: PAIN MEDICINE | Facility: CLINIC | Age: 63
End: 2023-07-27
Payer: OTHER GOVERNMENT

## 2023-07-27 ENCOUNTER — PATIENT MESSAGE (OUTPATIENT)
Dept: PAIN MEDICINE | Facility: CLINIC | Age: 63
End: 2023-07-27
Payer: OTHER GOVERNMENT

## 2023-07-27 RX ORDER — METHYLPREDNISOLONE 4 MG/1
TABLET ORAL
Qty: 21 EACH | Refills: 0 | Status: SHIPPED | OUTPATIENT
Start: 2023-07-27 | End: 2023-08-17

## 2023-07-27 NOTE — TELEPHONE ENCOUNTER
----- Message from Abigail Cleveland sent at 7/27/2023  7:32 AM CDT -----  Type: Needs Medical Advice  Who Called:  pt  Symptoms (please be specific):  pt said he need to know if he can get a rx for his pain, also need to know if he can get a sooner appt said he have one on 8/4 and he need to know if he can be seen 8/3 or 8/7 early in the am--said he need to speak to the nurse to explain what he need--please call and advise  Best Call Back Number: [phone] (home)   Additional Information: thank you

## 2023-07-29 NOTE — TELEPHONE ENCOUNTER
Patient schedule TPI with primary care and wants to know if his appointment with you on 8/4 could be virtual?

## 2023-08-01 ENCOUNTER — TELEPHONE (OUTPATIENT)
Dept: PAIN MEDICINE | Facility: CLINIC | Age: 63
End: 2023-08-01
Payer: OTHER GOVERNMENT

## 2023-08-01 ENCOUNTER — PATIENT MESSAGE (OUTPATIENT)
Dept: PAIN MEDICINE | Facility: CLINIC | Age: 63
End: 2023-08-01
Payer: OTHER GOVERNMENT

## 2023-08-01 NOTE — TELEPHONE ENCOUNTER
It looks like in Dr. Campbell's last note next visit needs to be in person as we need to have 1 in person visit every 12 months.

## 2023-08-04 ENCOUNTER — OFFICE VISIT (OUTPATIENT)
Dept: PAIN MEDICINE | Facility: CLINIC | Age: 63
End: 2023-08-04
Payer: OTHER GOVERNMENT

## 2023-08-04 ENCOUNTER — HOSPITAL ENCOUNTER (OUTPATIENT)
Dept: RADIOLOGY | Facility: HOSPITAL | Age: 63
Discharge: HOME | End: 2023-08-04
Attending: ANESTHESIOLOGY
Payer: OTHER GOVERNMENT

## 2023-08-04 VITALS
HEART RATE: 89 BPM | DIASTOLIC BLOOD PRESSURE: 110 MMHG | WEIGHT: 105 LBS | BODY MASS INDEX: 15.03 KG/M2 | HEIGHT: 70 IN | SYSTOLIC BLOOD PRESSURE: 160 MMHG

## 2023-08-04 DIAGNOSIS — M51.36 DDD (DEGENERATIVE DISC DISEASE), LUMBAR: Primary | ICD-10-CM

## 2023-08-04 DIAGNOSIS — M53.3 SACRAL PAIN: ICD-10-CM

## 2023-08-04 DIAGNOSIS — M53.3 SACRAL PAIN: Primary | ICD-10-CM

## 2023-08-04 PROCEDURE — 72220 X-RAY EXAM SACRUM TAILBONE: CPT | Mod: 26,,, | Performed by: RADIOLOGY

## 2023-08-04 PROCEDURE — 72220 XR SACRUM AND COCCYX: ICD-10-PCS | Mod: 26,,, | Performed by: RADIOLOGY

## 2023-08-04 PROCEDURE — 99215 OFFICE O/P EST HI 40 MIN: CPT | Mod: PBBFAC,PN | Performed by: ANESTHESIOLOGY

## 2023-08-04 PROCEDURE — 99999 PR PBB SHADOW E&M-EST. PATIENT-LVL V: ICD-10-PCS | Mod: PBBFAC,,, | Performed by: ANESTHESIOLOGY

## 2023-08-04 PROCEDURE — 99999 PR PBB SHADOW E&M-EST. PATIENT-LVL V: CPT | Mod: PBBFAC,,, | Performed by: ANESTHESIOLOGY

## 2023-08-04 PROCEDURE — 99214 PR OFFICE/OUTPT VISIT, EST, LEVL IV, 30-39 MIN: ICD-10-PCS | Mod: S$PBB,,, | Performed by: ANESTHESIOLOGY

## 2023-08-04 PROCEDURE — 72220 X-RAY EXAM SACRUM TAILBONE: CPT | Mod: TC,PO

## 2023-08-04 PROCEDURE — 99214 OFFICE O/P EST MOD 30 MIN: CPT | Mod: S$PBB,,, | Performed by: ANESTHESIOLOGY

## 2023-08-04 RX ORDER — OXYCODONE AND ACETAMINOPHEN 7.5; 325 MG/1; MG/1
1 TABLET ORAL EVERY 6 HOURS PRN
Qty: 40 TABLET | Refills: 0 | Status: SHIPPED | OUTPATIENT
Start: 2023-08-04 | End: 2024-02-16

## 2023-08-04 NOTE — PROGRESS NOTES
This note was completed with dictation software and grammatical errors may exist.    CC:  Sacral pain    HPI:  The patient is a 62-year-old man with a history of cryptogenic organizing pneumonia, cervical fusion, chronic migraine headaches, pituitary tumor who presents in self-referral for neck pain and back pain.  He returns in follow-up today, several weeks ago he had fallen on his buttock, apparently had a syncopal episode.  He does not remember this but states that he had severe left buttock pain, went to the emergency department and had x-rays, no fractures were found at that point but he continued to have severe pain.  He has been given a heart monitor to use for right now, has had no more syncopal episodes.  I had sent Medrol Dosepak for him to use to hopefully eliminate his pain and he states that this decrease his symptoms by about 60-70%.  He is doing much better but still has significant pain, difficulty standing, walking, increased pain in the buttock.  He denies any weakness in the legs.    Previous History:  The patient reports having a long history of neck pain, bilateral upper back pain, chronic migraine headaches particularly on the left side and low back pain with bilateral lower leg numbness and tingling.  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  He has had to change physicians regularly because of this.  He states that he was having severe neck pain and numbness and tingling down his left arm in 2016, ended up having a C6/7 ACDF but apparently there was a posterior approach as well.  He states that the symptoms did improve somewhat but in the last 2 years have become severely worse.  He reports that due to his surgery he did have some recurrent laryngeal nerve issue and has had difficulty with swallowing and speaking.  He states that the neck pain, upper back  "pain and bilateral shoulder pain has worsened in the last several years but states that he has not seen a physician about this.  He states that he has weakness in his arms, weakness in his legs and the pain is getting so severe that he feels that he cannot live life and has even considered suicide.  He had seen a pain management physician several years ago in [place] but they had recommended injections any decided not to do that.  For the last several years he has not seen anybody about this.    In terms of his low back pain is across bilateral low back, worse with standing and walking improved with sitting down.  He also has pain in the lower legs and feet with numbness, burning and aching.  The pain is worse with prolonged sitting worse with getting out of a bed or a chair.      He also has a history of chronic migraines that last anywhere from 8-24 hours.  He has recently started seeing a neurologist who is giving him Aimovig and this seems to be helping.    Pain intervention history:  No history of injections    Spine surgeries:  He had undergone anterior and posterior cervical surgeries in about 2016, no surgery on his lumbar spine    Antineuropathics:  Gabapentin and Lyrica caused imbalance issues  NSAIDs:  Motrin provide slight relief but causes swelling, Tylenol no benefit.  Lidocaine patch, Voltaren gel slight benefit.  Physical therapy:  Has not done any physical therapy recently.  Antidepressants:  Muscle relaxers:  Muscle relaxants cause imbalance  Opioids:  Percocet has helped in the past  Antiplatelets/Anticoagulants:        ROS:  He reports weight loss, weakness, depression, suicidal ideation.  Balance of review of systems is negative.    No results found for: "LABA1C", "HGBA1C"    No results found for: "WBC", "HGB", "HCT", "MCV", "PLT"          Past Medical History:   Diagnosis Date    H/O: pituitary tumor     Thyroid disease     Ventricular tachycardia        Past Surgical History:   Procedure " "Laterality Date    history of cervical fusion      transphenoidal pituitary tumor resection         Social History     Socioeconomic History    Marital status:    Tobacco Use    Smoking status: Never    Smokeless tobacco: Never   Substance and Sexual Activity    Alcohol use: Never    Drug use: Never         Medications/Allergies: See med card    Vitals:    08/04/23 0937   BP: (!) 160/110   Pulse: 89   Weight: 47.6 kg (105 lb)   Height: 5' 10" (1.778 m)   PainSc:   7   PainLoc: Back         Physical exam:  Gen: A and O x3, pleasant, well-groomed  Skin: No rashes or obvious lesions  HEENT: PERRLA, no obvious deformities on ears or in canals. Trachea midline.  CVS: Regular rate and rhythm, normal palpable pulses.  Resp:No increased work of breathing, symmetrical chest rise.  Abdomen: Soft, NT/ND.  Musculoskeletal: No antalgic gait.     Neuro:    Iliopsoas Quadriceps Knee  Flexion Tibialis  anterior Gastro- cnemius EHL   Lower: R 5/5 5/5 5/5 5/5 5/5 5/5    L 5/5 5/5 5/5 5/5 5/5 5/5      Left  Right    Patellar DTR 2+ 2+   Achilles DTR 2+ 2+   Ugarte Absent  Absent   Clonus Absent Absent   Babinski Absent Absent     Intact and symmetrical to light touch and pinprick in L1-S1 dermatomes bilaterally.    Lumbar spine:  Lumbar spine: ROM is moderately reduced with both flexion and extension with increased pain on extension.    Evan's test causes no increased pain in the left sacral region.    Supine straight leg raise is negative bilaterally.    Internal and external rotation of the hip causes no increased pain on either side.  Myofascial exam:  There is tenderness palpation over the left sacral ala and left posterior hemipelvis.    Imaging:  MRI lumbar spine outside institution, I reviewed this personally with the patient.  There is some mild facet arthropathy at L3/4, L4/5 and L5/S1.  Mild broad base bulging at L4/5 with no canal narrowing but mild to moderate foraminal narrowing left slightly greater than " right.    MRI cervical spine 12/02/2020 outside institution:  I reviewed these images personally with the patient.  This shows previous fusion at C6/7, facet arthropathy at multiple levels, does have canal narrowing due to posterior ligamentous hypertrophy and disc bulging at C3/4.  There is no cord signal change.  He reports having an EMG of the lower extremities, none of the upper extremities.    Assessment:   The patient is a 62-year-old man with a history of cryptogenic organizing pneumonia, cervical fusion, chronic migraine headaches, pituitary tumor who presents in self-referral for neck pain and back pain.    1. Sacral pain  X-Ray Sacrum And Coccyx    MRI Pelvis Without Contrast          Plan:  1. We discussed that since he had such severe pain and he is so frail, I would like to get 1 more x-ray of the sacrum to make sure he does not have a fracture but also an MRI may  something that is not seen on the x-ray.  If he does not have a fracture, we can treat the symptoms and hopefully this is just bone bruising.  We reviewed his lumbar spine MRI from the past and he did not have any severe canal stenosis but did have some foraminal narrowing on the left at L4/5.  I have given him some more Percocet with a stronger dose of 7.5/325 to take as needed for now.  Can back down to 5/325 once his pain is better controlled.  We will have him scheduled for his regularly scheduled follow-up.  2. We discussed possibly using a TENS unit, I have placed orders for this.

## 2023-08-09 ENCOUNTER — TELEPHONE (OUTPATIENT)
Dept: PAIN MEDICINE | Facility: CLINIC | Age: 63
End: 2023-08-09
Payer: OTHER GOVERNMENT

## 2023-08-09 NOTE — TELEPHONE ENCOUNTER
Please call this patient, likely just needs an authorization number for the MRI that I already ordered

## 2023-08-09 NOTE — TELEPHONE ENCOUNTER
----- Message from Antionette Edge MA sent at 8/9/2023  2:50 PM CDT -----  Please see message for request  ----- Message -----  From: Katarina Bravo  Sent: 8/8/2023   2:58 PM CDT  To: Adrian MONTOYA Staff    Type:  Patient Returning Call    Who Called: Pt  Would the patient rather a call back or a response via [facility]? Both  Best Call Back Number: [phone]  Additional Information: Pt would like to speak to someone concerning a referral request for MRI and the Durable Medical Tens Unit for insurance purpose.

## 2023-08-10 ENCOUNTER — PATIENT MESSAGE (OUTPATIENT)
Dept: PAIN MEDICINE | Facility: CLINIC | Age: 63
End: 2023-08-10
Payer: OTHER GOVERNMENT

## 2023-09-13 ENCOUNTER — PATIENT MESSAGE (OUTPATIENT)
Dept: PAIN MEDICINE | Facility: CLINIC | Age: 63
End: 2023-09-13
Payer: OTHER GOVERNMENT

## 2023-09-18 ENCOUNTER — PATIENT MESSAGE (OUTPATIENT)
Dept: PAIN MEDICINE | Facility: CLINIC | Age: 63
End: 2023-09-18
Payer: OTHER GOVERNMENT

## 2023-10-05 ENCOUNTER — PATIENT MESSAGE (OUTPATIENT)
Dept: ELECTROPHYSIOLOGY | Facility: CLINIC | Age: 63
End: 2023-10-05
Payer: OTHER GOVERNMENT

## 2023-10-12 ENCOUNTER — PATIENT MESSAGE (OUTPATIENT)
Dept: PAIN MEDICINE | Facility: CLINIC | Age: 63
End: 2023-10-12
Payer: OTHER GOVERNMENT

## 2023-10-12 RX ORDER — OXYCODONE AND ACETAMINOPHEN 5; 325 MG/1; MG/1
1 TABLET ORAL EVERY 8 HOURS PRN
Qty: 75 TABLET | Refills: 0 | Status: SHIPPED | OUTPATIENT
Start: 2023-10-12 | End: 2023-11-13 | Stop reason: SDUPTHER

## 2023-10-15 ENCOUNTER — PATIENT MESSAGE (OUTPATIENT)
Dept: ELECTROPHYSIOLOGY | Facility: CLINIC | Age: 63
End: 2023-10-15
Payer: OTHER GOVERNMENT

## 2023-10-18 ENCOUNTER — TELEPHONE (OUTPATIENT)
Dept: ELECTROPHYSIOLOGY | Facility: CLINIC | Age: 63
End: 2023-10-18
Payer: OTHER GOVERNMENT

## 2023-10-18 ENCOUNTER — PATIENT MESSAGE (OUTPATIENT)
Dept: ELECTROPHYSIOLOGY | Facility: CLINIC | Age: 63
End: 2023-10-18
Payer: OTHER GOVERNMENT

## 2023-10-18 NOTE — TELEPHONE ENCOUNTER
"Called pt to request records be resent and they confirmed the request. Also notified them that it showed they may be unable to connect for the virtual visit and "they stated they have never had any issues before so Friday 10/20 should be no different".   "

## 2023-10-19 ENCOUNTER — PATIENT MESSAGE (OUTPATIENT)
Dept: ELECTROPHYSIOLOGY | Facility: CLINIC | Age: 63
End: 2023-10-19
Payer: OTHER GOVERNMENT

## 2023-10-20 DIAGNOSIS — I49.3 PVC (PREMATURE VENTRICULAR CONTRACTION): Primary | ICD-10-CM

## 2023-10-20 DIAGNOSIS — I47.29 NSVT (NONSUSTAINED VENTRICULAR TACHYCARDIA): ICD-10-CM

## 2023-11-12 ENCOUNTER — PATIENT MESSAGE (OUTPATIENT)
Dept: PAIN MEDICINE | Facility: CLINIC | Age: 63
End: 2023-11-12
Payer: OTHER GOVERNMENT

## 2023-11-12 ENCOUNTER — PATIENT MESSAGE (OUTPATIENT)
Dept: ELECTROPHYSIOLOGY | Facility: CLINIC | Age: 63
End: 2023-11-12
Payer: OTHER GOVERNMENT

## 2023-11-13 RX ORDER — OXYCODONE AND ACETAMINOPHEN 5; 325 MG/1; MG/1
1 TABLET ORAL EVERY 8 HOURS PRN
Qty: 75 TABLET | Refills: 0 | Status: SHIPPED | OUTPATIENT
Start: 2023-11-13 | End: 2023-12-13 | Stop reason: SDUPTHER

## 2023-11-22 ENCOUNTER — PATIENT MESSAGE (OUTPATIENT)
Dept: ELECTROPHYSIOLOGY | Facility: CLINIC | Age: 63
End: 2023-11-22
Payer: OTHER GOVERNMENT

## 2023-11-29 ENCOUNTER — CLINICAL SUPPORT (OUTPATIENT)
Dept: CARDIOLOGY | Facility: HOSPITAL | Age: 63
End: 2023-11-29
Attending: INTERNAL MEDICINE
Payer: OTHER GOVERNMENT

## 2023-11-29 DIAGNOSIS — I47.29 NSVT (NONSUSTAINED VENTRICULAR TACHYCARDIA): ICD-10-CM

## 2023-11-29 DIAGNOSIS — I49.3 PVC (PREMATURE VENTRICULAR CONTRACTION): ICD-10-CM

## 2023-11-29 DIAGNOSIS — I49.3 PVC (PREMATURE VENTRICULAR CONTRACTION): Primary | ICD-10-CM

## 2023-11-30 ENCOUNTER — PATIENT MESSAGE (OUTPATIENT)
Dept: PAIN MEDICINE | Facility: CLINIC | Age: 63
End: 2023-11-30
Payer: OTHER GOVERNMENT

## 2023-12-13 ENCOUNTER — PATIENT MESSAGE (OUTPATIENT)
Dept: PAIN MEDICINE | Facility: CLINIC | Age: 63
End: 2023-12-13
Payer: OTHER GOVERNMENT

## 2023-12-13 RX ORDER — OXYCODONE AND ACETAMINOPHEN 5; 325 MG/1; MG/1
1 TABLET ORAL EVERY 8 HOURS PRN
Qty: 75 TABLET | Refills: 0 | Status: SHIPPED | OUTPATIENT
Start: 2023-12-13 | End: 2024-01-17 | Stop reason: SDUPTHER

## 2023-12-13 NOTE — TELEPHONE ENCOUNTER
Please don't send these messages as CC chart. This one would need to come in a refill request or patient advice.

## 2023-12-14 ENCOUNTER — TELEPHONE (OUTPATIENT)
Dept: PAIN MEDICINE | Facility: CLINIC | Age: 63
End: 2023-12-14
Payer: OTHER GOVERNMENT

## 2024-01-11 ENCOUNTER — PATIENT MESSAGE (OUTPATIENT)
Dept: PAIN MEDICINE | Facility: CLINIC | Age: 64
End: 2024-01-11
Payer: OTHER GOVERNMENT

## 2024-01-17 ENCOUNTER — PATIENT MESSAGE (OUTPATIENT)
Dept: PAIN MEDICINE | Facility: CLINIC | Age: 64
End: 2024-01-17
Payer: OTHER GOVERNMENT

## 2024-01-18 RX ORDER — OXYCODONE AND ACETAMINOPHEN 5; 325 MG/1; MG/1
1 TABLET ORAL EVERY 8 HOURS PRN
Qty: 75 TABLET | Refills: 0 | Status: SHIPPED | OUTPATIENT
Start: 2024-01-18 | End: 2024-02-16 | Stop reason: SDUPTHER

## 2024-02-16 ENCOUNTER — PATIENT MESSAGE (OUTPATIENT)
Dept: PAIN MEDICINE | Facility: CLINIC | Age: 64
End: 2024-02-16
Payer: OTHER GOVERNMENT

## 2024-02-16 RX ORDER — OXYCODONE AND ACETAMINOPHEN 5; 325 MG/1; MG/1
1 TABLET ORAL EVERY 8 HOURS PRN
Qty: 75 TABLET | Refills: 0 | Status: SHIPPED | OUTPATIENT
Start: 2024-02-16 | End: 2024-03-17

## 2024-02-22 ENCOUNTER — OFFICE VISIT (OUTPATIENT)
Dept: PAIN MEDICINE | Facility: CLINIC | Age: 64
End: 2024-02-22
Payer: OTHER GOVERNMENT

## 2024-02-22 DIAGNOSIS — G89.4 CHRONIC PAIN DISORDER: Primary | ICD-10-CM

## 2024-02-22 DIAGNOSIS — R53.81 DEBILITY: ICD-10-CM

## 2024-02-22 DIAGNOSIS — R53.83 FATIGUE, UNSPECIFIED TYPE: ICD-10-CM

## 2024-02-22 DIAGNOSIS — R26.81 UNSTEADY GAIT: ICD-10-CM

## 2024-02-22 DIAGNOSIS — M48.02 CERVICAL SPINAL STENOSIS: ICD-10-CM

## 2024-02-22 PROCEDURE — 99212 OFFICE O/P EST SF 10 MIN: CPT | Mod: 95,,, | Performed by: ANESTHESIOLOGY

## 2024-02-22 RX ORDER — DICLOFENAC SODIUM 10 MG/G
2 GEL TOPICAL 3 TIMES DAILY PRN
Qty: 50 G | Refills: 6 | Status: SHIPPED | OUTPATIENT
Start: 2024-02-22

## 2024-02-22 RX ORDER — OXYCODONE AND ACETAMINOPHEN 5; 325 MG/1; MG/1
1 TABLET ORAL EVERY 8 HOURS PRN
Qty: 75 TABLET | Refills: 0 | Status: SHIPPED | OUTPATIENT
Start: 2024-05-16 | End: 2024-06-15

## 2024-02-22 RX ORDER — OXYCODONE AND ACETAMINOPHEN 5; 325 MG/1; MG/1
1 TABLET ORAL EVERY 8 HOURS PRN
Qty: 75 TABLET | Refills: 0 | Status: SHIPPED | OUTPATIENT
Start: 2024-03-17 | End: 2024-04-16

## 2024-02-22 RX ORDER — OXYCODONE AND ACETAMINOPHEN 5; 325 MG/1; MG/1
1 TABLET ORAL EVERY 8 HOURS PRN
Qty: 75 TABLET | Refills: 0 | Status: SHIPPED | OUTPATIENT
Start: 2024-04-16 | End: 2024-05-30 | Stop reason: SDUPTHER

## 2024-02-22 RX ORDER — LIDOCAINE 50 MG/G
PATCH TOPICAL DAILY
Qty: 30 PATCH | Refills: 6 | Status: SHIPPED | OUTPATIENT
Start: 2024-02-22

## 2024-02-22 NOTE — PROGRESS NOTES
This note was completed with dictation software and grammatical errors may exist.    The patient location is: [place]  The chief complaint leading to consultation is: Back and joint pain    Visit type: audiovisual    Face to Face time with patient: 12 minutes  16 minutes of total time spent on the encounter, which includes face to face time and non-face to face time preparing to see the patient (eg, review of tests), Obtaining and/or reviewing separately obtained history, Documenting clinical information in the electronic or other health record, Independently interpreting results (not separately reported) and communicating results to the patient/family/caregiver, or Care coordination (not separately reported).         Each patient to whom he or she provides medical services by telemedicine is:  (1) informed of the relationship between the physician and patient and the respective role of any other health care provider with respect to management of the patient; and (2) notified that he or she may decline to receive medical services by telemedicine and may withdraw from such care at any time.    Notes:       CC:  Back pain, multiple joint pain    HPI:  The patient is a 63-year-old man with a history of cryptogenic organizing pneumonia, cervical fusion, chronic migraine headaches, pituitary tumor who presents in self-referral for neck pain and back pain.  Since his last visit, he feels fatigued which he attributes to anemia.  He has been reporting issues with his bone marrow, neutropenia and anemic.  He states that when he does activity, he gets quickly fatigue.  He does better during the beginning of the day and by the end of the day feels much worse.  Nonetheless he denies any worsened shortness of breath, continues to have pain control with current Percocet prescription, denies any major side effects from the medication, adequately allows him to continue activities.      Previous History:  The patient reports  having a long history of neck pain, bilateral upper back pain, chronic migraine headaches particularly on the left side and low back pain with bilateral lower leg numbness and tingling.  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  He has had to change physicians regularly because of this.  He states that he was having severe neck pain and numbness and tingling down his left arm in 2016, ended up having a C6/7 ACDF but apparently there was a posterior approach as well.  He states that the symptoms did improve somewhat but in the last 2 years have become severely worse.  He reports that due to his surgery he did have some recurrent laryngeal nerve issue and has had difficulty with swallowing and speaking.  He states that the neck pain, upper back pain and bilateral shoulder pain has worsened in the last several years but states that he has not seen a physician about this.  He states that he has weakness in his arms, weakness in his legs and the pain is getting so severe that he feels that he cannot live life and has even considered suicide.  He had seen a pain management physician several years ago in [place] but they had recommended injections any decided not to do that.  For the last several years he has not seen anybody about this.    In terms of his low back pain is across bilateral low back, worse with standing and walking improved with sitting down.  He also has pain in the lower legs and feet with numbness, burning and aching.  The pain is worse with prolonged sitting worse with getting out of a bed or a chair.      He also has a history of chronic migraines that last anywhere from 8-24 hours.  He has recently started seeing a neurologist who is giving him Aimovig and this seems to be helping.    Pain intervention history:  No history of injections    Spine surgeries:  He  "had undergone anterior and posterior cervical surgeries in about 2016, no surgery on his lumbar spine    Antineuropathics:  Gabapentin and Lyrica caused imbalance issues  NSAIDs:  Motrin provide slight relief but causes swelling, Tylenol no benefit.  Lidocaine patch, Voltaren gel slight benefit.  Physical therapy:  Has not done any physical therapy recently.  Antidepressants:  Muscle relaxers:  Muscle relaxants cause imbalance  Opioids:  Percocet has helped in the past  Antiplatelets/Anticoagulants:        ROS:  He reports weight loss, weakness, depression, suicidal ideation.  Balance of review of systems is negative.    No results found for: "LABA1C", "HGBA1C"    No results found for: "WBC", "HGB", "HCT", "MCV", "PLT"          Past Medical History:   Diagnosis Date    H/O: pituitary tumor     Thyroid disease     Ventricular tachycardia        Past Surgical History:   Procedure Laterality Date    history of cervical fusion      transphenoidal pituitary tumor resection         Social History     Socioeconomic History    Marital status:    Tobacco Use    Smoking status: Never    Smokeless tobacco: Never   Substance and Sexual Activity    Alcohol use: Never    Drug use: Never     Social Determinants of Health     Financial Resource Strain: Medium Risk (11/28/2023)    Overall Financial Resource Strain (CARDIA)     Difficulty of Paying Living Expenses: Somewhat hard   Food Insecurity: Food Insecurity Present (11/28/2023)    Hunger Vital Sign     Worried About Running Out of Food in the Last Year: Sometimes true     Ran Out of Food in the Last Year: Sometimes true   Transportation Needs: Unmet Transportation Needs (11/28/2023)    PRAPARE - Transportation     Lack of Transportation (Medical): Yes     Lack of Transportation (Non-Medical): Yes   Physical Activity: Insufficiently Active (11/28/2023)    Exercise Vital Sign     Days of Exercise per Week: 3 days     Minutes of Exercise per Session: 30 min   Stress: No " Stress Concern Present (11/28/2023)    [employer] [place] [employer] - Occupational Stress Questionnaire     Feeling of Stress : Not at all   Social Connections: Unknown (11/28/2023)    Social Connection and Isolation Panel [NHANES]     Frequency of Communication with Friends and Family: Patient declined     Frequency of Social Gatherings with Friends and Family: Patient declined     Active Member of Clubs or Organizations: Patient declined     Attends Club or Organization Meetings: Patient declined     Marital Status:    Housing Stability: Low Risk  (11/28/2023)    Housing Stability Vital Sign     Unable to Pay for Housing in the Last Year: No     Number of Places Lived in the Last Year: 1     Unstable Housing in the Last Year: No         Medications/Allergies: See med card    There were no vitals filed for this visit.        Physical exam:  Gen: A and O x3, pleasant, well-groomed      Imaging:  MRI lumbar spine outside institution, I reviewed this personally with the patient.  There is some mild facet arthropathy at L3/4, L4/5 and L5/S1.  Mild broad base bulging at L4/5 with no canal narrowing but mild to moderate foraminal narrowing left slightly greater than right.    MRI cervical spine 12/02/2020 outside institution:  I reviewed these images personally with the patient.  This shows previous fusion at C6/7, facet arthropathy at multiple levels, does have canal narrowing due to posterior ligamentous hypertrophy and disc bulging at C3/4.  There is no cord signal change.  He reports having an EMG of the lower extremities, none of the upper extremities.    Assessment:   The patient is a 62-year-old man with a history of cryptogenic organizing pneumonia, cervical fusion, chronic migraine headaches, pituitary tumor who presents in self-referral for neck pain and back pain.    1. Chronic pain disorder        2. Debility        3. Unsteady gait        4. Fatigue, unspecified type        5. Cervical  spinal stenosis              Plan:  1.  He has had no major changes in his pain, things are stable, currently gets moderate relief from the pain medication, improves his activities of daily living.  He has shown no signs of abuse or addiction. [place] Board of Pharmacy website checked and no aberrant patterns noted.  2.  I have refilled his medications, he will follow up in 3 months or sooner as needed.    The total time spent for evaluation and management today including reviewing separately obtained history, performing a medically appropriate exam and evaluation, documenting clinical information in the health record, independently interpreting results and communicating them to the patient/family/caregiver, and ordering medications/tests/procedures was between 10-19 minutes.

## 2024-02-28 ENCOUNTER — PATIENT MESSAGE (OUTPATIENT)
Dept: PAIN MEDICINE | Facility: CLINIC | Age: 64
End: 2024-02-28
Payer: OTHER GOVERNMENT

## 2024-02-28 ENCOUNTER — TELEPHONE (OUTPATIENT)
Dept: PAIN MEDICINE | Facility: CLINIC | Age: 64
End: 2024-02-28
Payer: OTHER GOVERNMENT

## 2024-03-01 ENCOUNTER — TELEPHONE (OUTPATIENT)
Dept: PAIN MEDICINE | Facility: CLINIC | Age: 64
End: 2024-03-01
Payer: OTHER GOVERNMENT

## 2024-03-01 NOTE — TELEPHONE ENCOUNTER
----- Message from Nellie Meneses sent at 3/1/2024  3:23 PM CST -----  Contact: [facility]  Type:  Pharmacy Calling to Clarify an RX    Name of Caller:  Pharmacy  Pharmacy Name:    [facility] - [place] - [address]  [address] [facility]  [address]  [place] [address]  Phone: [phone] Fax: [phone]  Prescription Name:   LIDOcaine (LIDODERM) 5 % 30 patch 6 2/22/2024 -   Sig - Route: Place onto the skin once daily. - Transdermal   What do they need to clarify?:  The needs clarification on rx directions.  Additional Information:  Please call the pharmacy back asap. Direct line [phone]. Thanks!

## 2024-05-30 ENCOUNTER — OFFICE VISIT (OUTPATIENT)
Dept: PAIN MEDICINE | Facility: CLINIC | Age: 64
End: 2024-05-30
Payer: OTHER GOVERNMENT

## 2024-05-30 DIAGNOSIS — M51.36 DDD (DEGENERATIVE DISC DISEASE), LUMBAR: ICD-10-CM

## 2024-05-30 DIAGNOSIS — R26.89 IMBALANCE: Primary | ICD-10-CM

## 2024-05-30 DIAGNOSIS — M54.12 CERVICAL RADICULOPATHY: ICD-10-CM

## 2024-05-30 PROCEDURE — 99213 OFFICE O/P EST LOW 20 MIN: CPT | Mod: 95,,, | Performed by: ANESTHESIOLOGY

## 2024-05-30 RX ORDER — OXYCODONE AND ACETAMINOPHEN 5; 325 MG/1; MG/1
1 TABLET ORAL EVERY 8 HOURS PRN
Qty: 75 TABLET | Refills: 0 | Status: SHIPPED | OUTPATIENT
Start: 2024-08-14 | End: 2024-09-13

## 2024-05-30 RX ORDER — OXYCODONE AND ACETAMINOPHEN 5; 325 MG/1; MG/1
1 TABLET ORAL EVERY 8 HOURS PRN
Qty: 75 TABLET | Refills: 0 | Status: SHIPPED | OUTPATIENT
Start: 2024-07-15 | End: 2024-08-14

## 2024-05-30 RX ORDER — OXYCODONE AND ACETAMINOPHEN 5; 325 MG/1; MG/1
1 TABLET ORAL EVERY 8 HOURS PRN
Qty: 75 TABLET | Refills: 0 | Status: SHIPPED | OUTPATIENT
Start: 2024-06-15 | End: 2024-07-15

## 2024-05-30 NOTE — PROGRESS NOTES
This note was completed with dictation software and grammatical errors may exist.    The patient location is: [place]  The chief complaint leading to consultation is: Back and joint pain    Visit type: audiovisual    Face to Face time with patient: 12 minutes  22 minutes of total time spent on the encounter, which includes face to face time and non-face to face time preparing to see the patient (eg, review of tests), Obtaining and/or reviewing separately obtained history, Documenting clinical information in the electronic or other health record, Independently interpreting results (not separately reported) and communicating results to the patient/family/caregiver, or Care coordination (not separately reported).         Each patient to whom he or she provides medical services by telemedicine is:  (1) informed of the relationship between the physician and patient and the respective role of any other health care provider with respect to management of the patient; and (2) notified that he or she may decline to receive medical services by telemedicine and may withdraw from such care at any time.    Notes:       CC:  Back pain, multiple joint pain    HPI:  The patient is a 63-year-old man with a history of cryptogenic organizing pneumonia, cervical fusion, chronic migraine headaches, pituitary tumor who presents in self-referral for neck pain and back pain.  He returns in follow-up today for regularly scheduled visit via telemedicine.  He reports that he continues to feel fatigued but has been complaining of worsened pain with activities, walking.  Overall he feels weaker.  In terms of his breathing, he feels that this has not improved, has communicated with his medical team and it was the patient himself who looked up possible treatments for cryptogenic organizing pneumonia and had reached out to his team about prescribing an antibiotic which he just started yesterday.      Previous History:  The patient reports having  a long history of neck pain, bilateral upper back pain, chronic migraine headaches particularly on the left side and low back pain with bilateral lower leg numbness and tingling.  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  He has had to change physicians regularly because of this.  He states that he was having severe neck pain and numbness and tingling down his left arm in 2016, ended up having a C6/7 ACDF but apparently there was a posterior approach as well.  He states that the symptoms did improve somewhat but in the last 2 years have become severely worse.  He reports that due to his surgery he did have some recurrent laryngeal nerve issue and has had difficulty with swallowing and speaking.  He states that the neck pain, upper back pain and bilateral shoulder pain has worsened in the last several years but states that he has not seen a physician about this.  He states that he has weakness in his arms, weakness in his legs and the pain is getting so severe that he feels that he cannot live life and has even considered suicide.  He had seen a pain management physician several years ago in [place] but they had recommended injections any decided not to do that.  For the last several years he has not seen anybody about this.    In terms of his low back pain is across bilateral low back, worse with standing and walking improved with sitting down.  He also has pain in the lower legs and feet with numbness, burning and aching.  The pain is worse with prolonged sitting worse with getting out of a bed or a chair.      He also has a history of chronic migraines that last anywhere from 8-24 hours.  He has recently started seeing a neurologist who is giving him Aimovig and this seems to be helping.    Pain intervention history:  No history of injections    Spine surgeries:  He had  "undergone anterior and posterior cervical surgeries in about 2016, no surgery on his lumbar spine    Antineuropathics:  Gabapentin and Lyrica caused imbalance issues  NSAIDs:  Motrin provide slight relief but causes swelling, Tylenol no benefit.  Lidocaine patch, Voltaren gel slight benefit.  Physical therapy:  Has not done any physical therapy recently.  Antidepressants:  Muscle relaxers:  Muscle relaxants cause imbalance  Opioids:  Percocet has helped in the past  Antiplatelets/Anticoagulants:        ROS:  He reports weight loss, weakness, depression, suicidal ideation.  Balance of review of systems is negative.    No results found for: "LABA1C", "HGBA1C"    No results found for: "WBC", "HGB", "HCT", "MCV", "PLT"          Past Medical History:   Diagnosis Date    H/O: pituitary tumor     Thyroid disease     Ventricular tachycardia        Past Surgical History:   Procedure Laterality Date    history of cervical fusion      transphenoidal pituitary tumor resection         Social History     Socioeconomic History    Marital status:    Tobacco Use    Smoking status: Never    Smokeless tobacco: Never   Substance and Sexual Activity    Alcohol use: Never    Drug use: Never     Social Determinants of Health     Financial Resource Strain: Medium Risk (11/28/2023)    Overall Financial Resource Strain (CARDIA)     Difficulty of Paying Living Expenses: Somewhat hard   Food Insecurity: Food Insecurity Present (11/28/2023)    Hunger Vital Sign     Worried About Running Out of Food in the Last Year: Sometimes true     Ran Out of Food in the Last Year: Sometimes true   Transportation Needs: Unmet Transportation Needs (11/28/2023)    PRAPARE - Transportation     Lack of Transportation (Medical): Yes     Lack of Transportation (Non-Medical): Yes   Physical Activity: Insufficiently Active (11/28/2023)    Exercise Vital Sign     Days of Exercise per Week: 3 days     Minutes of Exercise per Session: 30 min   Stress: No Stress " Concern Present (11/28/2023)    [employer] - Occupational Stress Questionnaire     Feeling of Stress : Not at all   Housing Stability: Low Risk  (11/28/2023)    Housing Stability Vital Sign     Unable to Pay for Housing in the Last Year: No     Number of Places Lived in the Last Year: 1     Unstable Housing in the Last Year: No         Medications/Allergies: See med card    There were no vitals filed for this visit.        Physical exam:  Gen: A and O x3, pleasant, well-groomed      Imaging:  MRI lumbar spine outside institution, I reviewed this personally with the patient.  There is some mild facet arthropathy at L3/4, L4/5 and L5/S1.  Mild broad base bulging at L4/5 with no canal narrowing but mild to moderate foraminal narrowing left slightly greater than right.    MRI cervical spine 12/02/2020 outside institution:  I reviewed these images personally with the patient.  This shows previous fusion at C6/7, facet arthropathy at multiple levels, does have canal narrowing due to posterior ligamentous hypertrophy and disc bulging at C3/4.  There is no cord signal change.  He reports having an EMG of the lower extremities, none of the upper extremities.    Assessment:   The patient is a 63-year-old man with a history of cryptogenic organizing pneumonia, cervical fusion, chronic migraine headaches, pituitary tumor who presents in self-referral for neck pain and back pain.    1. Imbalance  Ambulatory referral/consult to Physical/Occupational Therapy      2. Cervical radiculopathy  Ambulatory referral/consult to Physical/Occupational Therapy      3. DDD (degenerative disc disease), lumbar  Ambulatory referral/consult to Physical/Occupational Therapy              Plan:  1.  We discussed that he is having overall weakness, increased pain and so I suggested that we set him up with some formal physical therapy and he would like to do this.  I have placed orders, we are going to send it to physical  therapy place in his area.    2. I have refilled his Percocet, continues to have relief with this, denies any major side effects.  He has not shown any signs of abuse or addiction with this.

## 2024-08-15 ENCOUNTER — PATIENT MESSAGE (OUTPATIENT)
Dept: PAIN MEDICINE | Facility: CLINIC | Age: 64
End: 2024-08-15
Payer: OTHER GOVERNMENT

## 2024-08-16 RX ORDER — LIDOCAINE 50 MG/G
PATCH TOPICAL DAILY
Qty: 30 PATCH | Refills: 6 | Status: SHIPPED | OUTPATIENT
Start: 2024-08-16

## 2024-08-16 RX ORDER — DICLOFENAC SODIUM 10 MG/G
2 GEL TOPICAL 3 TIMES DAILY PRN
Qty: 50 G | Refills: 6 | Status: SHIPPED | OUTPATIENT
Start: 2024-08-16

## 2024-08-16 RX ORDER — OXYCODONE AND ACETAMINOPHEN 5; 325 MG/1; MG/1
1 TABLET ORAL EVERY 8 HOURS PRN
Qty: 75 TABLET | Refills: 0 | Status: SHIPPED | OUTPATIENT
Start: 2024-08-16 | End: 2024-09-15

## 2024-09-17 DIAGNOSIS — D70.9 NEUTROPENIA, UNSPECIFIED TYPE (CMS/HCC): Primary | ICD-10-CM

## 2024-09-24 DIAGNOSIS — J18.9 PNEUMONIA DUE TO INFECTIOUS ORGANISM, UNSPECIFIED LATERALITY, UNSPECIFIED PART OF LUNG: Primary | ICD-10-CM

## 2024-09-30 ENCOUNTER — TELEPHONE (OUTPATIENT)
Dept: PULMONOLOGY | Facility: CLINIC | Age: 64
End: 2024-09-30

## 2024-09-30 NOTE — TELEPHONE ENCOUNTER
Spoke with referring office. Informed them we are in need of previous images and reports for review. She will work on getting them from [place] where pt moved from. Nurse advised if pt's provider feels this is emergent they will need to call and speak with a pulmonary provider to discuss case. Zuri KAYE states understanding and will reach out to her provider there.

## 2024-09-30 NOTE — TELEPHONE ENCOUNTER
Zuri RN Utilization Review Nurse Manager- [phone]    Called and states that the Chief of Medical Staff from [facility] is asking if we can get this patient seen this week or next week.    Please contact Zuri regarding this matter for further information.

## 2024-10-09 ENCOUNTER — TELEPHONE (OUTPATIENT)
Dept: ONCOLOGY | Facility: CLINIC | Age: 64
End: 2024-10-09

## 2024-10-09 NOTE — TELEPHONE ENCOUNTER
If patient/family calling feels like their symptoms are emergent they need to go the emergency department.  Nursing will return their call as soon as possible within a 24 hour period (usually before the end of working hours).       Patient: Homer Jean    Contact: [phone]    Name of person calling: Homer    Facility:    Provider: N/A    Current Treatment:    Symptoms: n/a    Reason for call: Calling regarding referral would like to get scheduled    RX to be refilled:    What Pharmacy?

## 2024-10-09 NOTE — TELEPHONE ENCOUNTER
Returned pt call regarding closed  hematology referral. Advised pt that the referring provider has been provided with the on-call physician number for guidance on management or additional testing. Pt verbalized understanding.

## 2024-10-10 ENCOUNTER — TELEPHONE (OUTPATIENT)
Dept: ONCOLOGY | Facility: CLINIC | Age: 64
End: 2024-10-10

## 2024-10-10 NOTE — TELEPHONE ENCOUNTER
If patient/family calling feels like their symptoms are emergent they need to go the emergency department.  Nursing will return their call as soon as possible within a 24 hour period (usually before the end of working hours).       Patient: Homer Jean    Contact: [phone]    Name of person calling: Zuri    Facility: [facility]    Provider: N/A    Current Treatment:    Symptoms:N/A    Reason for call: Please call Zuri regarding Referral for Homer    RX to be refilled:    What Pharmacy?

## 2024-10-15 ENCOUNTER — PATIENT MESSAGE (OUTPATIENT)
Dept: PAIN MEDICINE | Facility: CLINIC | Age: 64
End: 2024-10-15
Payer: OTHER GOVERNMENT

## 2024-10-16 RX ORDER — OXYCODONE AND ACETAMINOPHEN 10; 325 MG/1; MG/1
1 TABLET ORAL EVERY 8 HOURS PRN
Qty: 75 TABLET | Refills: 0 | Status: SHIPPED | OUTPATIENT
Start: 2024-10-16

## 2024-10-16 NOTE — TELEPHONE ENCOUNTER
Spoke with pt and verbalized the recommendations per Dr. Campbell. Pt verbalized  that he understands that he needs to remain a resident in [place].

## 2024-10-16 NOTE — TELEPHONE ENCOUNTER
I will try and send a refill for him to [place].  Otherwise, this is going to be the last refill, I can not continue treating him if he lives in a different state and can not come for appointments.  He needs to make an appointment with a doctor in his area

## 2024-10-24 ENCOUNTER — TELEPHONE (OUTPATIENT)
Dept: ONCOLOGY | Facility: CLINIC | Age: 64
End: 2024-10-24

## 2024-10-24 NOTE — TELEPHONE ENCOUNTER
Dr. Tigre Rausch from [facility] just called and would like to discuss this patient with an oncologist. He is available over a lunch hour or tomorrow at 1pm. His number is [phone].

## 2024-11-12 ENCOUNTER — PATIENT MESSAGE (OUTPATIENT)
Dept: PAIN MEDICINE | Facility: CLINIC | Age: 64
End: 2024-11-12
Payer: OTHER GOVERNMENT

## 2024-11-13 ENCOUNTER — TELEPHONE (OUTPATIENT)
Dept: PAIN MEDICINE | Facility: CLINIC | Age: 64
End: 2024-11-13
Payer: OTHER GOVERNMENT

## 2024-11-13 NOTE — TELEPHONE ENCOUNTER
----- Message from [facility] sent at 11/13/2024  9:21 AM CST -----  Regarding: Referral  Good morning,    Provider would like to refer the patient to Dr. Campbell in the Pain Medicine department.      The patients diagnosis is: other chronic pain    I have scanned the patients referral and records into .     Please review and contact patient to schedule appointment. If there are no appointments available at this time, please advise and I will inform the referring provider/clinic      Thank you,   [facility]

## 2024-11-13 NOTE — TELEPHONE ENCOUNTER
Spoke with patient and told him referral is in his chart in media. He declined making an appointment at this time

## 2024-11-14 ENCOUNTER — PATIENT MESSAGE (OUTPATIENT)
Dept: PAIN MEDICINE | Facility: CLINIC | Age: 64
End: 2024-11-14
Payer: OTHER GOVERNMENT

## 2025-01-06 ENCOUNTER — CLINICAL DOCUMENTATION ONLY (OUTPATIENT)
Dept: PULMONOLOGY | Facility: CLINIC | Age: 65
End: 2025-01-06

## 2025-04-08 DIAGNOSIS — D64.9 ANEMIA, UNSPECIFIED TYPE: ICD-10-CM

## 2025-04-08 DIAGNOSIS — D70.9 NEUTROPENIA, UNSPECIFIED TYPE (CMS/HCC): Primary | ICD-10-CM

## 2025-04-14 ENCOUNTER — TELEPHONE (OUTPATIENT)
Dept: ONCOLOGY | Facility: CLINIC | Age: 65
End: 2025-04-14
Payer: OTHER GOVERNMENT

## 2025-04-14 NOTE — TELEPHONE ENCOUNTER
Patient rereferred for neutropenia; previously declined due to facility's case load. Patient would like to consider referral prior to pursuing. Agreed to return call in two weeks. Previous bone marrow biopsies performed 4-5 years ago at [facility] in [place].

## 2025-08-05 ENCOUNTER — TELEPHONE (OUTPATIENT)
Dept: ONCOLOGY | Facility: CLINIC | Age: 65
End: 2025-08-05
Payer: MEDICARE

## 2025-08-05 DIAGNOSIS — D70.8 OTHER NEUTROPENIA (CMS/HCC): Primary | ICD-10-CM

## 2025-08-25 ENCOUNTER — OFFICE VISIT (OUTPATIENT)
Dept: ONCOLOGY | Facility: CLINIC | Age: 65
End: 2025-08-25
Payer: OTHER GOVERNMENT

## 2025-08-25 VITALS
HEIGHT: 70 IN | DIASTOLIC BLOOD PRESSURE: 70 MMHG | OXYGEN SATURATION: 95 % | SYSTOLIC BLOOD PRESSURE: 119 MMHG | TEMPERATURE: 97.3 F | WEIGHT: 105 LBS | BODY MASS INDEX: 15.03 KG/M2 | HEART RATE: 72 BPM

## 2025-08-25 DIAGNOSIS — D70.8 OTHER NEUTROPENIA (CMS/HCC): Primary | ICD-10-CM

## 2025-08-25 PROCEDURE — G0463 HOSPITAL OUTPT CLINIC VISIT: HCPCS

## 2025-08-25 RX ORDER — LEVOFLOXACIN 500 MG/1
500 TABLET, FILM COATED ORAL DAILY
Qty: 7 TABLET | Refills: 1 | Status: SHIPPED | OUTPATIENT
Start: 2025-08-25

## 2025-08-25 ASSESSMENT — PAIN SCALES - GENERAL: PAINLEVEL_OUTOF10: 8
